# Patient Record
Sex: MALE | Race: WHITE | NOT HISPANIC OR LATINO | Employment: FULL TIME | URBAN - METROPOLITAN AREA
[De-identification: names, ages, dates, MRNs, and addresses within clinical notes are randomized per-mention and may not be internally consistent; named-entity substitution may affect disease eponyms.]

---

## 2017-03-30 ENCOUNTER — OFFICE VISIT (OUTPATIENT)
Dept: MEDICAL GROUP | Age: 37
End: 2017-03-30
Payer: COMMERCIAL

## 2017-03-30 VITALS
HEIGHT: 71 IN | HEART RATE: 64 BPM | TEMPERATURE: 97.8 F | WEIGHT: 202.2 LBS | OXYGEN SATURATION: 99 % | BODY MASS INDEX: 28.31 KG/M2 | SYSTOLIC BLOOD PRESSURE: 116 MMHG | RESPIRATION RATE: 16 BRPM | DIASTOLIC BLOOD PRESSURE: 74 MMHG

## 2017-03-30 DIAGNOSIS — Z00.00 ANNUAL PHYSICAL EXAM: ICD-10-CM

## 2017-03-30 DIAGNOSIS — E78.5 HYPERLIPIDEMIA, UNSPECIFIED HYPERLIPIDEMIA TYPE: ICD-10-CM

## 2017-03-30 PROCEDURE — 99385 PREV VISIT NEW AGE 18-39: CPT | Performed by: PHYSICIAN ASSISTANT

## 2017-03-30 NOTE — ASSESSMENT & PLAN NOTE
This is a 36-year-old male who is here today for a physical. No complaints today. He takes Lipitor for hyperlipidemia. Last labs were about 2 years ago. Both of his parents have high cholesterol. Father has diabetes. He doesn't smoke or drink. He is  with 4 children. 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

## 2017-03-30 NOTE — PROGRESS NOTES
"Subjective:   Jacky Paz is a 36 y.o. male here today for annual physical.    Annual physical exam  This is a 36-year-old male who is here today for a physical. No complaints today. He takes Lipitor for hyperlipidemia. Last labs were about 2 years ago. Both of his parents have high cholesterol. Father has diabetes. He doesn't smoke or drink. He is  with 4 children. 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       Current medicines (including changes today)  Current Outpatient Prescriptions   Medication Sig Dispense Refill   • atorvastatin (LIPITOR) 20 MG TABS Take 20 mg by mouth every evening.       No current facility-administered medications for this visit.     He  has no past medical history on file.    ROS   No chest pain, no shortness of breath, no abdominal pain and all other systems were reviewed and are negative.       Objective:     Blood pressure 116/74, pulse 64, temperature 36.6 °C (97.8 °F), resp. rate 16, height 1.803 m (5' 10.98\"), weight 91.717 kg (202 lb 3.2 oz), SpO2 99 %. Body mass index is 28.21 kg/(m^2).   Physical Exam:  Constitutional: Alert, no distress.  Skin: Warm, dry, good turgor, no rashes in visible areas.  Eye: Equal, round and reactive, conjunctiva clear, lids normal.  ENMT: Lips without lesions, good dentition, oropharynx clear.  Neck: Trachea midline, no masses.   Lymph: No cervical or supraclavicular lymphadenopathy  Respiratory: Unlabored respiratory effort, lungs clear to auscultation, no wheezes, no ronchi.  Cardiovascular: Normal S1, S2, no murmur, no edema.  Abdomen: Soft, non-tender, no masses.  Psych: Alert and oriented x3, normal affect and mood.        Assessment and Plan:   The following treatment plan was discussed    1. Annual physical exam  Generally healthy male. Order lipid profile. May contact me when medication refill as necessary. Also order CMP and hemoglobin A1c. Will contact with results.  - LIPID PANEL  - COMP METABOLIC PANEL; Future  - HEMOGLOBIN A1C; " Future      Followup: Return if symptoms worsen or fail to improve.    Please note that this dictation was created using voice recognition software. I have made every reasonable attempt to correct obvious errors, but I expect that there are errors of grammar and possibly content that I did not discover before finalizing the note.

## 2017-03-30 NOTE — MR AVS SNAPSHOT
"        Jacky Paz   3/30/2017 4:20 PM   Office Visit   MRN: [id number]    Department:  [address] [place] [address]   Dept Phone:  [phone]    Description:  Male : 1980   Provider:  Uli Anderson PA-C           Reason for Visit     Establish Care labs and check-up      Allergies as of 3/30/2017     No Known Allergies      You were diagnosed with     Annual physical exam   [169540]         Vital Signs     Blood Pressure Pulse Temperature Respirations Height Weight    116/74 mmHg 64 36.6 °C (97.8 °F) 16 1.803 m (5' 10.98\") 91.717 kg (202 lb 3.2 oz)    Body Mass Index Oxygen Saturation Smoking Status             28.21 kg/m2 99% Never Smoker          Basic Information     Date Of Birth Sex Race Ethnicity Preferred Language    1980 Male White Non- English      Problem List              ICD-10-CM Priority Class Noted - Resolved    Annual physical exam Z00.00   3/30/2017 - Present      Health Maintenance        Date Due Completion Dates    IMM INFLUENZA (1) 3/30/2018 (Originally 2016) ---    IMM DTaP/Tdap/Td Vaccine (2 - Td) 8/3/2025 8/3/2015            Current Immunizations     Tdap Vaccine 8/3/2015      Below and/or attached are the medications your provider expects you to take. Review all of your home medications and newly ordered medications with your provider and/or pharmacist. Follow medication instructions as directed by your provider and/or pharmacist. Please keep your medication list with you and share with your provider. Update the information when medications are discontinued, doses are changed, or new medications (including over-the-counter products) are added; and carry medication information at all times in the event of emergency situations     Allergies:  No Known Allergies          Medications  Valid as of: 2017 -  4:52 PM    Generic Name Brand Name Tablet Size Instructions for use    Atorvastatin Calcium (Tab) LIPITOR 20 MG Take 20 mg by mouth every evening.        .      "           Medicines prescribed today were sent to:     [id number] - [place], [place] - [address]    [address] [place] [address]    Phone: [phone] Fax: [phone]    Open 24 Hours?: No      Medication refill instructions:       If your prescription bottle indicates you have medication refills left, it is not necessary to call your provider’s office. Please contact your pharmacy and they will refill your medication.    If your prescription bottle indicates you do not have any refills left, you may request refills at any time through one of the following ways: The online hoccer system (except Urgent Care), by calling your provider’s office, or by asking your pharmacy to contact your provider’s office with a refill request. Medication refills are processed only during regular business hours and may not be available until the next business day. Your provider may request additional information or to have a follow-up visit with you prior to refilling your medication.   *Please Note: Medication refills are assigned a new Rx number when refilled electronically. Your pharmacy may indicate that no refills were authorized even though a new prescription for the same medication is available at the pharmacy. Please request the medicine by name with the pharmacy before contacting your provider for a refill.        Your To Do List     Future Labs/Procedures Complete By Expires    COMP METABOLIC PANEL  As directed 3/30/2018    HEMOGLOBIN A1C  As directed 3/30/2018         hoccer Access Code: [email or web address]  Expires: 4/29/2017  4:07 PM    hoccer  A secure, online tool to manage your health information     [employer] hoccer® is a secure, online tool that connects you to your personalized health information from the privacy of your home -- day or night - making it very easy for you to manage your healthcare. Once the activation process is completed, you can even access your medical information  using the [facility] [facility] júnior, which is available for free in the [employer] Júnior store or Google Play store.     W[employer] Inns provides the following levels of access (as shown below):   My Chart Features   [facility] Doctor [facility]  Specialists [facility]  Urgent  Care Non-[facility]  Primary Care  Doctor   Email your healthcare team securely and privately 24/7 X X X    Manage appointments: schedule your next appointment; view details of past/upcoming appointments X      Request prescription refills. X      View recent personal medical records, including lab and immunizations X X X X   View health record, including health history, allergies, medications X X X X   Read reports about your outpatient visits, procedures, consult and ER notes X X X X   See your discharge summary, which is a recap of your hospital and/or ER visit that includes your diagnosis, lab results, and care plan. X X       How to register for W[employer] Inns:  1. Go to  [email or web address].  2. Click on the Sign Up Now box, which takes you to the New Member Sign Up page. You will need to provide the following information:  a. Enter your W[employer] Inns Access Code exactly as it appears at the top of this page. (You will not need to use this code after you’ve completed the sign-up process. If you do not sign up before the expiration date, you must request a new code.)   b. Enter your date of birth.   c. Enter your home email address.   d. Click Submit, and follow the next screen’s instructions.  3. Create a Wear Inns ID. This will be your W[employer] Inns login ID and cannot be changed, so think of one that is secure and easy to remember.  4. Create a [facility] Inns password. You can change your password at any time.  5. Enter your Password Reset Question and Answer. This can be used at a later time if you forget your password.   6. Enter your e-mail address. This allows you to receive e-mail notifications when new information is available in W[employer] Inns.  7. Click Sign Up. You can now view your  health information.    For assistance activating your [employer] account, call [phone]

## 2017-05-19 ENCOUNTER — HOSPITAL ENCOUNTER (OUTPATIENT)
Dept: LAB | Facility: MEDICAL CENTER | Age: 37
End: 2017-05-19
Attending: PHYSICIAN ASSISTANT
Payer: COMMERCIAL

## 2017-05-19 DIAGNOSIS — Z00.00 ANNUAL PHYSICAL EXAM: ICD-10-CM

## 2017-05-19 LAB
ALBUMIN SERPL BCP-MCNC: 4.6 G/DL (ref 3.2–4.9)
ALBUMIN/GLOB SERPL: 1.5 G/DL
ALP SERPL-CCNC: 97 U/L (ref 30–99)
ALT SERPL-CCNC: 27 U/L (ref 2–50)
ANION GAP SERPL CALC-SCNC: 6 MMOL/L (ref 0–11.9)
AST SERPL-CCNC: 24 U/L (ref 12–45)
BILIRUB SERPL-MCNC: 2.6 MG/DL (ref 0.1–1.5)
BUN SERPL-MCNC: 17 MG/DL (ref 8–22)
CALCIUM SERPL-MCNC: 9.9 MG/DL (ref 8.5–10.5)
CHLORIDE SERPL-SCNC: 104 MMOL/L (ref 96–112)
CHOLEST SERPL-MCNC: 212 MG/DL (ref 100–199)
CO2 SERPL-SCNC: 28 MMOL/L (ref 20–33)
CREAT SERPL-MCNC: 1.1 MG/DL (ref 0.5–1.4)
CREAT UR-MCNC: 175.3 MG/DL
EST. AVERAGE GLUCOSE BLD GHB EST-MCNC: 103 MG/DL
GFR SERPL CREATININE-BSD FRML MDRD: >60 ML/MIN/1.73 M 2
GLOBULIN SER CALC-MCNC: 3.1 G/DL (ref 1.9–3.5)
GLUCOSE SERPL-MCNC: 89 MG/DL (ref 65–99)
HBA1C MFR BLD: 5.2 % (ref 0–5.6)
HDLC SERPL-MCNC: 48 MG/DL
LDLC SERPL CALC-MCNC: 142 MG/DL
MICROALBUMIN UR-MCNC: <0.7 MG/DL
MICROALBUMIN/CREAT UR: NORMAL MG/G (ref 0–30)
POTASSIUM SERPL-SCNC: 4 MMOL/L (ref 3.6–5.5)
PROT SERPL-MCNC: 7.7 G/DL (ref 6–8.2)
SODIUM SERPL-SCNC: 138 MMOL/L (ref 135–145)
TRIGL SERPL-MCNC: 108 MG/DL (ref 0–149)

## 2017-05-19 PROCEDURE — 80053 COMPREHEN METABOLIC PANEL: CPT

## 2017-05-19 PROCEDURE — 36415 COLL VENOUS BLD VENIPUNCTURE: CPT

## 2017-05-19 PROCEDURE — 83036 HEMOGLOBIN GLYCOSYLATED A1C: CPT

## 2017-05-19 PROCEDURE — 80061 LIPID PANEL: CPT

## 2017-05-19 PROCEDURE — 82043 UR ALBUMIN QUANTITATIVE: CPT

## 2017-05-19 PROCEDURE — 82570 ASSAY OF URINE CREATININE: CPT

## 2017-05-22 DIAGNOSIS — E78.5 HYPERLIPIDEMIA, UNSPECIFIED HYPERLIPIDEMIA TYPE: ICD-10-CM

## 2017-05-22 DIAGNOSIS — R17 TOTAL BILIRUBIN, ELEVATED: ICD-10-CM

## 2017-05-22 RX ORDER — ATORVASTATIN CALCIUM 20 MG/1
20 TABLET, FILM COATED ORAL
Qty: 30 TAB | Status: CANCELLED | OUTPATIENT
Start: 2017-05-22

## 2018-05-07 ENCOUNTER — OFFICE VISIT (OUTPATIENT)
Dept: MEDICAL GROUP | Facility: MEDICAL CENTER | Age: 38
End: 2018-05-07
Payer: COMMERCIAL

## 2018-05-07 VITALS
HEIGHT: 71 IN | HEART RATE: 56 BPM | TEMPERATURE: 98.3 F | SYSTOLIC BLOOD PRESSURE: 116 MMHG | WEIGHT: 208 LBS | OXYGEN SATURATION: 100 % | BODY MASS INDEX: 29.12 KG/M2 | DIASTOLIC BLOOD PRESSURE: 70 MMHG

## 2018-05-07 DIAGNOSIS — Z82.49 FAMILY HISTORY OF PREMATURE CAD: ICD-10-CM

## 2018-05-07 DIAGNOSIS — Z13.21 ENCOUNTER FOR VITAMIN DEFICIENCY SCREENING: ICD-10-CM

## 2018-05-07 DIAGNOSIS — E78.5 HYPERLIPIDEMIA LDL GOAL <100: ICD-10-CM

## 2018-05-07 DIAGNOSIS — Z13.29 SCREENING FOR THYROID DISORDER: ICD-10-CM

## 2018-05-07 DIAGNOSIS — Z13.89 SCREENING FOR MULTIPLE CONDITIONS: ICD-10-CM

## 2018-05-07 PROCEDURE — 99214 OFFICE O/P EST MOD 30 MIN: CPT | Performed by: NURSE PRACTITIONER

## 2018-05-07 ASSESSMENT — PATIENT HEALTH QUESTIONNAIRE - PHQ9: CLINICAL INTERPRETATION OF PHQ2 SCORE: 0

## 2018-05-08 NOTE — PROGRESS NOTES
"  Subjective:     Jacky Paz is a 38 y.o. male who presents with hyperlipidemia.    HPI:   Seen in f/u for hyperlipdiemia.  He has a + FH of very high chol.  Mother and mat cousin with premature CAD.  He was initially on 10 mg.  Then last year he was inc to 20 mg d/t LDL of 142.    He has high chol.  He is on lipitor.  They inc dose last year. needs new lab. He is kiara the lipitor w/o s/e.    Otherwise feeling well.  Just got over URI with cough.       Patient Active Problem List    Diagnosis Date Noted   • Annual physical exam 03/30/2017   • Hyperlipidemia 03/30/2017       Current medicines (including changes today)  Current Outpatient Prescriptions   Medication Sig Dispense Refill   • atorvastatin (LIPITOR) 20 MG TABS Take 20 mg by mouth every evening.       No current facility-administered medications for this visit.        No Known Allergies    ROS  Constitutional: Negative. Negative for fever, chills, weight loss, malaise/fatigue and diaphoresis.   HENT: Negative. Negative for hearing loss, ear pain, nosebleeds, congestion, sore throat, neck pain, tinnitus and ear discharge.   Respiratory: Negative. Negative for cough, hemoptysis, sputum production, shortness of breath, wheezing and stridor.   Cardiovascular: Negative. Negative for chest pain, palpitations, orthopnea, claudication, leg swelling and PND.   Gastrointestinal: Denies nausea, vomiting, diarrhea, constipation, heartburn, melena or hematochezia.  Genitourinary: Denies dysuria, hematuria, urinary incontinence, frequency or urgency.        Objective:     Blood pressure 116/70, pulse (!) 56, temperature 36.8 °C (98.3 °F), height 1.803 m (5' 11\"), weight 94.3 kg (208 lb), SpO2 100 %. Body mass index is 29.01 kg/m².    Physical Exam:  Vitals reviewed.  Constitutional: Oriented to person, place, and time. appears well-developed and well-nourished. No distress.   Cardiovascular: Normal rate, regular rhythm, normal heart sounds and intact distal pulses. " Exam reveals no gallop and no friction rub. No murmur heard. No carotid bruits.   Pulmonary/Chest: Effort normal and breath sounds normal. No stridor. No respiratory distress. no wheezes or rales. exhibits no tenderness.   Musculoskeletal: Normal range of motion. exhibits no edema. rudy pedal pulses 2+.  Lymphadenopathy: No cervical or supraclavicular adenopathy.   Neurological: Alert and oriented to person, place, and time. exhibits normal muscle tone.  Skin: Skin is warm and dry. No diaphoresis.   Psychiatric: Normal mood and affect. Behavior is normal.      Assessment and Plan:     The following treatment plan was discussed:    1. Hyperlipidemia LDL goal <100  COMP METABOLIC PANEL    LIPID PROFILE    CT-HEART W/O CONT EVAL CALCIUM    do lab wiht CMP, LP, TSH, D.  f/u with PCP for review   2. Family history of premature CAD  CT-HEART W/O CONT EVAL CALCIUM    check ct for ca++ score.  f/u with pt with results.     3. Screening for thyroid disorder  TSH   4. Screening for multiple conditions  COMP METABOLIC PANEL   5. Encounter for vitamin deficiency screening  VITAMIN D,25 HYDROXY         Followup: Return in about 4 weeks (around 6/4/2018).

## 2018-05-18 ENCOUNTER — HOSPITAL ENCOUNTER (OUTPATIENT)
Dept: LAB | Facility: MEDICAL CENTER | Age: 38
End: 2018-05-18
Attending: NURSE PRACTITIONER
Payer: COMMERCIAL

## 2018-05-18 ENCOUNTER — HOSPITAL ENCOUNTER (OUTPATIENT)
Dept: RADIOLOGY | Facility: MEDICAL CENTER | Age: 38
End: 2018-05-18
Attending: NURSE PRACTITIONER

## 2018-05-18 DIAGNOSIS — E78.5 HYPERLIPIDEMIA LDL GOAL <100: ICD-10-CM

## 2018-05-18 DIAGNOSIS — Z82.49 FAMILY HISTORY OF PREMATURE CAD: ICD-10-CM

## 2018-05-18 DIAGNOSIS — Z13.21 ENCOUNTER FOR VITAMIN DEFICIENCY SCREENING: ICD-10-CM

## 2018-05-18 DIAGNOSIS — Z13.89 SCREENING FOR MULTIPLE CONDITIONS: ICD-10-CM

## 2018-05-18 DIAGNOSIS — Z13.29 SCREENING FOR THYROID DISORDER: ICD-10-CM

## 2018-05-18 LAB
25(OH)D3 SERPL-MCNC: 21 NG/ML (ref 30–100)
ALBUMIN SERPL BCP-MCNC: 4.6 G/DL (ref 3.2–4.9)
ALBUMIN/GLOB SERPL: 1.6 G/DL
ALP SERPL-CCNC: 81 U/L (ref 30–99)
ALT SERPL-CCNC: 47 U/L (ref 2–50)
ANION GAP SERPL CALC-SCNC: 7 MMOL/L (ref 0–11.9)
AST SERPL-CCNC: 25 U/L (ref 12–45)
BILIRUB SERPL-MCNC: 1.8 MG/DL (ref 0.1–1.5)
BUN SERPL-MCNC: 21 MG/DL (ref 8–22)
CALCIUM SERPL-MCNC: 9.7 MG/DL (ref 8.5–10.5)
CHLORIDE SERPL-SCNC: 107 MMOL/L (ref 96–112)
CHOLEST SERPL-MCNC: 184 MG/DL (ref 100–199)
CO2 SERPL-SCNC: 27 MMOL/L (ref 20–33)
CREAT SERPL-MCNC: 1.02 MG/DL (ref 0.5–1.4)
GLOBULIN SER CALC-MCNC: 2.9 G/DL (ref 1.9–3.5)
GLUCOSE SERPL-MCNC: 94 MG/DL (ref 65–99)
HDLC SERPL-MCNC: 46 MG/DL
LDLC SERPL CALC-MCNC: 110 MG/DL
POTASSIUM SERPL-SCNC: 4 MMOL/L (ref 3.6–5.5)
PROT SERPL-MCNC: 7.5 G/DL (ref 6–8.2)
SODIUM SERPL-SCNC: 141 MMOL/L (ref 135–145)
TRIGL SERPL-MCNC: 138 MG/DL (ref 0–149)
TSH SERPL DL<=0.005 MIU/L-ACNC: 1.56 UIU/ML (ref 0.38–5.33)

## 2018-05-18 PROCEDURE — 82306 VITAMIN D 25 HYDROXY: CPT

## 2018-05-18 PROCEDURE — 80053 COMPREHEN METABOLIC PANEL: CPT

## 2018-05-18 PROCEDURE — 80061 LIPID PANEL: CPT

## 2018-05-18 PROCEDURE — 84443 ASSAY THYROID STIM HORMONE: CPT

## 2018-05-18 PROCEDURE — 4410556 CT-CARDIAC SCORING

## 2018-05-18 PROCEDURE — 36415 COLL VENOUS BLD VENIPUNCTURE: CPT

## 2018-05-19 ENCOUNTER — TELEPHONE (OUTPATIENT)
Dept: MEDICAL GROUP | Facility: MEDICAL CENTER | Age: 38
End: 2018-05-19

## 2018-05-19 DIAGNOSIS — R93.1 ELEVATED CORONARY ARTERY CALCIUM SCORE: ICD-10-CM

## 2018-05-19 NOTE — TELEPHONE ENCOUNTER
Please let pt know that the CT for coronary ca++ score shows probable mod risk for CAD.  i strongly recommend proceeding with stress test, echo.  Let me know if he is agreeable.  Then have him f/u after testing to discuss

## 2018-05-21 ENCOUNTER — TELEPHONE (OUTPATIENT)
Dept: MEDICAL GROUP | Facility: MEDICAL CENTER | Age: 38
End: 2018-05-21

## 2018-05-21 NOTE — LETTER
May 21, 2018        Jacky Paz  [address] [place] [address]        Dear Jacky:    After careful review of your chart, we have noted you are due a follow up appointment.  We request you call our office at [phone] at your earliest convenience and make an appointment.     We look forward to scheduling an appointment for you, so that we may provide you with the safest and most complete medical care.        If you have any questions or concerns, please don't hesitate to call.        Sincerely,        Tasia CALL    Electronically Signed

## 2018-05-21 NOTE — TELEPHONE ENCOUNTER
----- Message from ONEYDA Rutledge sent at 5/19/2018  3:09 PM PDT -----  Please have pt set appointment to review and discuss treatment for labs with me or his PCP - yamini beach.

## 2018-05-22 NOTE — TELEPHONE ENCOUNTER
Pt informed- pt states he will do the echo and stress test-pt will be out of the [place] for a week but will complete when he returns

## 2018-06-15 ENCOUNTER — TELEPHONE (OUTPATIENT)
Dept: MEDICAL GROUP | Facility: MEDICAL CENTER | Age: 38
End: 2018-06-15

## 2018-06-21 ENCOUNTER — OFFICE VISIT (OUTPATIENT)
Dept: MEDICAL GROUP | Facility: MEDICAL CENTER | Age: 38
End: 2018-06-21
Payer: COMMERCIAL

## 2018-06-21 VITALS
BODY MASS INDEX: 28.92 KG/M2 | TEMPERATURE: 97.9 F | SYSTOLIC BLOOD PRESSURE: 110 MMHG | HEART RATE: 67 BPM | DIASTOLIC BLOOD PRESSURE: 60 MMHG | OXYGEN SATURATION: 97 % | WEIGHT: 206.57 LBS | HEIGHT: 71 IN

## 2018-06-21 DIAGNOSIS — E80.4 GILBERT SYNDROME: ICD-10-CM

## 2018-06-21 DIAGNOSIS — E78.00 PURE HYPERCHOLESTEROLEMIA: ICD-10-CM

## 2018-06-21 DIAGNOSIS — E55.9 VITAMIN D DEFICIENCY: ICD-10-CM

## 2018-06-21 PROBLEM — Z00.00 ANNUAL PHYSICAL EXAM: Status: RESOLVED | Noted: 2017-03-30 | Resolved: 2018-06-21

## 2018-06-21 PROCEDURE — 99214 OFFICE O/P EST MOD 30 MIN: CPT | Performed by: PHYSICIAN ASSISTANT

## 2018-06-21 RX ORDER — ATORVASTATIN CALCIUM 40 MG/1
40 TABLET, FILM COATED ORAL
Qty: 90 TAB | Refills: 3 | Status: SHIPPED | OUTPATIENT
Start: 2018-06-21 | End: 2018-06-26

## 2018-06-21 RX ORDER — ATORVASTATIN CALCIUM 40 MG/1
20 TABLET, FILM COATED ORAL
Qty: 90 TAB | Refills: 3 | Status: SHIPPED | OUTPATIENT
Start: 2018-06-21 | End: 2018-06-21 | Stop reason: SDUPTHER

## 2018-06-21 NOTE — ASSESSMENT & PLAN NOTE
This is a 38-year-old male who comes in today to follow-up on his hyperlipidemia. Was seen by Tasia Hobson. She ordered a CT cardiac which showed the following:    Impression       1.  There is definite, at least moderate atherosclerotic plaque burden.    2.  Nonobstructive coronary artery disease is highly likely and obstructive disease is possible.    3.  The patient's cardiovascular risk is moderately high.    4.  Aggressive risk factor modification is suggested.    5.  Further evaluation should be based upon global assessment of cardiovascular risk factors in addition to this test. Testing for inducible ischemia may be indicated depending upon the patient's global assessment.     He is currently on 20 mg of Lipitor. Previously I had offered to increase the dose to 40 because his cholesterol profile is still elevated with an LDL of 110. He never got back to me. His wife made an appointment see Tasia Hobson. After the evaluation with her she ordered a stress test as well as a echocardiogram.

## 2018-06-21 NOTE — PROGRESS NOTES
"Subjective:   Jacky Paz is a 38 y.o. male here today for hyperlipidemia.    Hyperlipidemia  This is a 38-year-old male who comes in today to follow-up on his hyperlipidemia. Was seen by Tasia Hobson. She ordered a CT cardiac which showed the following:    Impression       1.  There is definite, at least moderate atherosclerotic plaque burden.    2.  Nonobstructive coronary artery disease is highly likely and obstructive disease is possible.    3.  The patient's cardiovascular risk is moderately high.    4.  Aggressive risk factor modification is suggested.    5.  Further evaluation should be based upon global assessment of cardiovascular risk factors in addition to this test. Testing for inducible ischemia may be indicated depending upon the patient's global assessment.     He is currently on 20 mg of Lipitor. Previously I had offered to increase the dose to 40 because his cholesterol profile is still elevated with an LDL of 110. He never got back to me. His wife made an appointment see Tasia Hobson. After the evaluation with her she ordered a stress test as well as a echocardiogram.    Gilbert syndrome  He does have a chronic history of Gilbert's syndrome. Last bilirubin was at 1.8. Previously it was 2.6. No history of jaundice.    Vitamin D deficiency  Vitamin D level was low at 21.       Current medicines (including changes today)  Current Outpatient Prescriptions   Medication Sig Dispense Refill   • atorvastatin (LIPITOR) 40 MG Tab Take 1 Tab by mouth every bedtime. 90 Tab 3     No current facility-administered medications for this visit.      He  has no past medical history on file.    Social History and Family History were reviewed and updated.    ROS   No chest pain, no shortness of breath, no abdominal pain and all other systems were reviewed and are negative.       Objective:     Blood pressure 110/60, pulse 67, temperature 36.6 °C (97.9 °F), height 1.803 m (5' 11\"), weight 93.7 kg (206 lb 9.1 " oz), SpO2 97 %. Body mass index is 28.81 kg/m².   Physical Exam:  Constitutional: Alert, no distress.  Skin: Warm, dry, good turgor, no rashes in visible areas.  Eye: Equal, round and reactive, conjunctiva clear, lids normal.  ENMT: Lips without lesions, good dentition, oropharynx clear.  Neck: Trachea midline, no masses.   Lymph: No cervical or supraclavicular lymphadenopathy  Respiratory: Unlabored respiratory effort, lung appear clear, no wheezes.  Cardiovascular: Normal S1, S2, no murmur, no edema.  Psych: Alert and oriented x3, normal affect and mood.        Assessment and Plan:   The following treatment plan was discussed    1. Pure hypercholesterolemia  Chronic condition. Given that his LDL isn't below 100 will increase Lipitor to 40 mg at bedtime. Repeat lipid panel in 6 weeks. Referred to vascular medicine to discuss familiar hyperlipidemia.  - LIPID PROFILE; Future  - REFERRAL TO VASCULAR MEDICINE Reason for Referral? Lipids  - atorvastatin (LIPITOR) 40 MG Tab; Take 1 Tab by mouth every bedtime.  Dispense: 90 Tab; Refill: 3    2. Gilbert syndrome  Chronic condition. Stable.    3. Vitamin D deficiency  Acute, new onset condition. Advised take over-the-counter vitamin D 2000 units daily. We'll repeat I and D at some point in the future.    Patient was seen for 25 minutes face to face of which > 50% of appointment time was spent on counseling and coordination of care regarding the above.    Followup: Return if symptoms worsen or fail to improve.    Please note that this dictation was created using voice recognition software. I have made every reasonable attempt to correct obvious errors, but I expect that there are errors of grammar and possibly content that I did not discover before finalizing the note.

## 2018-06-21 NOTE — ASSESSMENT & PLAN NOTE
He does have a chronic history of Gilbert's syndrome. Last bilirubin was at 1.8. Previously it was 2.6. No history of jaundice.

## 2018-06-26 DIAGNOSIS — E78.00 PURE HYPERCHOLESTEROLEMIA: ICD-10-CM

## 2018-06-26 RX ORDER — ROSUVASTATIN CALCIUM 40 MG/1
40 TABLET, COATED ORAL EVERY EVENING
Qty: 60 TAB | Refills: 1 | Status: SHIPPED | OUTPATIENT
Start: 2018-06-26 | End: 2018-08-24

## 2018-08-24 ENCOUNTER — HOSPITAL ENCOUNTER (OUTPATIENT)
Dept: LAB | Facility: MEDICAL CENTER | Age: 38
End: 2018-08-24
Attending: FAMILY MEDICINE
Payer: COMMERCIAL

## 2018-08-24 ENCOUNTER — OFFICE VISIT (OUTPATIENT)
Dept: VASCULAR LAB | Facility: MEDICAL CENTER | Age: 38
End: 2018-08-24
Attending: FAMILY MEDICINE
Payer: COMMERCIAL

## 2018-08-24 VITALS
BODY MASS INDEX: 28.84 KG/M2 | SYSTOLIC BLOOD PRESSURE: 122 MMHG | DIASTOLIC BLOOD PRESSURE: 66 MMHG | HEART RATE: 43 BPM | HEIGHT: 71 IN | WEIGHT: 206 LBS

## 2018-08-24 DIAGNOSIS — E78.00 PURE HYPERCHOLESTEROLEMIA: ICD-10-CM

## 2018-08-24 LAB
ALBUMIN SERPL BCP-MCNC: 4.8 G/DL (ref 3.2–4.9)
ALBUMIN/GLOB SERPL: 1.7 G/DL
ALP SERPL-CCNC: 77 U/L (ref 30–99)
ALT SERPL-CCNC: 35 U/L (ref 2–50)
ANION GAP SERPL CALC-SCNC: 6 MMOL/L (ref 0–11.9)
AST SERPL-CCNC: 27 U/L (ref 12–45)
BILIRUB SERPL-MCNC: 2.8 MG/DL (ref 0.1–1.5)
BUN SERPL-MCNC: 16 MG/DL (ref 8–22)
CALCIUM SERPL-MCNC: 10.2 MG/DL (ref 8.5–10.5)
CHLORIDE SERPL-SCNC: 105 MMOL/L (ref 96–112)
CO2 SERPL-SCNC: 28 MMOL/L (ref 20–33)
CREAT SERPL-MCNC: 1.04 MG/DL (ref 0.5–1.4)
CRP SERPL HS-MCNC: 0.5 MG/L (ref 0–7.5)
GLOBULIN SER CALC-MCNC: 2.9 G/DL (ref 1.9–3.5)
GLUCOSE SERPL-MCNC: 85 MG/DL (ref 65–99)
POTASSIUM SERPL-SCNC: 5.2 MMOL/L (ref 3.6–5.5)
PROT SERPL-MCNC: 7.7 G/DL (ref 6–8.2)
SODIUM SERPL-SCNC: 139 MMOL/L (ref 135–145)

## 2018-08-24 PROCEDURE — 99204 OFFICE O/P NEW MOD 45 MIN: CPT | Performed by: FAMILY MEDICINE

## 2018-08-24 PROCEDURE — 83704 LIPOPROTEIN BLD QUAN PART: CPT

## 2018-08-24 PROCEDURE — 36415 COLL VENOUS BLD VENIPUNCTURE: CPT

## 2018-08-24 PROCEDURE — 86141 C-REACTIVE PROTEIN HS: CPT

## 2018-08-24 PROCEDURE — 80053 COMPREHEN METABOLIC PANEL: CPT

## 2018-08-24 PROCEDURE — 80061 LIPID PANEL: CPT

## 2018-08-24 PROCEDURE — 99202 OFFICE O/P NEW SF 15 MIN: CPT | Performed by: FAMILY MEDICINE

## 2018-08-24 RX ORDER — ROSUVASTATIN CALCIUM 40 MG/1
40 TABLET, COATED ORAL DAILY
Qty: 90 TAB | Refills: 3 | Status: SHIPPED | OUTPATIENT
Start: 2018-08-24 | End: 2019-10-04 | Stop reason: SDUPTHER

## 2018-08-24 ASSESSMENT — ENCOUNTER SYMPTOMS
INSOMNIA: 0
BLOOD IN STOOL: 0
SORE THROAT: 0
DEPRESSION: 0
DIARRHEA: 0
FOCAL WEAKNESS: 0
HEADACHES: 0
SEIZURES: 0
FEVER: 0
TREMORS: 0
MYALGIAS: 0
NAUSEA: 0
BRUISES/BLEEDS EASILY: 0
COUGH: 0
DIZZINESS: 0
CHILLS: 0
BLURRED VISION: 0
DOUBLE VISION: 0
SHORTNESS OF BREATH: 0
WEAKNESS: 0
ORTHOPNEA: 0
HEMOPTYSIS: 0
PALPITATIONS: 0
NERVOUS/ANXIOUS: 0
ABDOMINAL PAIN: 0
WHEEZING: 0
VOMITING: 0

## 2018-08-24 NOTE — PROGRESS NOTES
INITIAL VASCULAR MED VISIT  Subjective:   Jacky Paz is a 38 y.o. male who presents today 8/24/2018 for   Chief Complaint   Patient presents with   • New Patient     Hypercholesterolemia   Initially referred by PCP (LEONARDO Anderson) for evaluation and mgmt of HLD    HPI:  Jacky Paz with pmhx with FH.  Tc = 399 when age 11. Untreated has been 270s.  Has been using lipitor and recent change to Crestor 40mg.         Current HTN concerns: Denies   HTN sx:  No current blurred or changed vision, chest pain, shortness of breath, headache, nausea, dizziness/vertigo   Home BP log: none   Adherence to current HTN meds: compliant all of the time   Antiplatelet/anticoagulation: No     Hyperlipidemia: Yes, Details: crestor 40mg , no myalgias.  started last month.  Previously on lipitor and tolerated.  Started age 22    Pertinent HTN hx:   Age at HTN dx:  No dx   Past HTN medications: none  HTN crises:  No prior hospitalization or crises   ASCVD risk factors:     DM: No   CKD:  No  Lifestyle factors:     High salt: No   EtOH: No  Interfering substances:     NSAIDs: No    Decongestants. No    ASCVD calculator (contraindicated if ASCVD+, [id number]-5)   10yr-risk calc: n/a , too young    Lifetime-risk calc: n/a    Clinical evidence of ASCVD:     1) hx of MI/ACS:  No   2) coronary or other revascularization procedure: No   3) TIA/ischemic CVA: No   4) PAD (including ODELL <0.9): No   5) documented (CAD, Renal athero, AA due to athero, carotid plaque >50% stenosis: No    Major RFs:     1) Age (Men>44, women>54):  no   2) Fhx early CAD (<55 men, <65 women):  yes   3) cigarette smoking:  No   4) high BP >139/89 or on tx: no   5) Low HDL-C (<40 men, <50 women):  no    Past Medical History:   Diagnosis Date   • HLD (hyperlipidemia)      Patient Active Problem List   Diagnosis   • Hyperlipidemia   • Vitamin D deficiency   • Gilbert syndrome       History reviewed. No pertinent surgical history.  Family History   Problem Relation Age  of Onset   • Hyperlipidemia Mother         FH, QS=710 untreated   • Heart Disease Mother         4vCABG in 40s, heart valve replacement   • Hypertension Mother    • Hyperlipidemia Father    • Diabetes Father    • Hypertension Father    • Hyperlipidemia Sister         FH, untreated MI=879   • Heart Attack Maternal Grandfather         60s,    • Hyperlipidemia Maternal Grandfather    • Heart Attack Paternal Grandfather          84   • Hyperlipidemia Sister         FH,  due to biking accident    • Heart Disease Cousin         maternal side, 5vCABG in 30s    • Hyperlipidemia Son         AR=195, dx age 11     History   Smoking Status   • Never Smoker   Smokeless Tobacco   • Never Used     Social History   Substance Use Topics   • Smoking status: Never Smoker   • Smokeless tobacco: Never Used   • Alcohol use No     Outpatient Encounter Prescriptions as of 2018   Medication Sig Dispense Refill   • rosuvastatin (CRESTOR) 40 MG tablet Take 1 Tab by mouth every day for 360 days. 90 Tab 3   • [DISCONTINUED] rosuvastatin (CRESTOR) 40 MG tablet Take 1 Tab by mouth every evening. 60 Tab 1     No facility-administered encounter medications on file as of 2018.      No Known Allergies  DIET AND EXERCISE:  Weight Change: no changes   Diet: low fat  Exercise: moderate regular exercise program     Review of Systems   Constitutional: Negative for chills, fever and malaise/fatigue.   HENT: Negative for nosebleeds, sore throat and tinnitus.    Eyes: Negative for blurred vision and double vision.   Respiratory: Negative for cough, hemoptysis, shortness of breath and wheezing.    Cardiovascular: Negative for chest pain, palpitations, orthopnea and leg swelling.   Gastrointestinal: Negative for abdominal pain, blood in stool, diarrhea, melena, nausea and vomiting.   Genitourinary: Negative for hematuria.   Musculoskeletal: Negative for joint pain and myalgias.   Skin: Negative for itching and rash.   Neurological:  "Negative for dizziness, tremors, focal weakness, seizures, weakness and headaches.   Endo/Heme/Allergies: Does not bruise/bleed easily.   Psychiatric/Behavioral: Negative for depression. The patient is not nervous/anxious and does not have insomnia.       Objective:     Vitals:    08/24/18 0953   BP: 122/66   Pulse: (!) 43   Weight: 93.4 kg (206 lb)   Height: 1.803 m (5' 11\")      BP Readings from Last 4 Encounters:   08/24/18 122/66   06/21/18 110/60   05/07/18 116/70   03/30/17 116/74       Body mass index is 28.73 kg/m².   BMI Readings from Last 4 Encounters:   08/24/18 28.73 kg/m²   06/21/18 28.81 kg/m²   05/07/18 29.01 kg/m²   03/30/17 28.21 kg/m²       Physical Exam   Constitutional: He is oriented to person, place, and time. He appears well-developed and well-nourished. He is cooperative. No distress.   HENT:   Head: Normocephalic and atraumatic.   Mouth/Throat: Oropharynx is clear and moist and mucous membranes are normal.   Eyes: Pupils are equal, round, and reactive to light. Conjunctivae are normal.   Neck: Trachea normal and normal range of motion. Neck supple. Normal carotid pulses and no JVD present. Carotid bruit is not present. No thyromegaly present.   Cardiovascular: Normal rate, regular rhythm, normal heart sounds and intact distal pulses.  Exam reveals no gallop and no friction rub.    No murmur heard.  Pulses:       Carotid pulses are 2+ on the right side, and 2+ on the left side.       Radial pulses are 2+ on the right side, and 2+ on the left side.        Dorsalis pedis pulses are 2+ on the right side, and 2+ on the left side.        Posterior tibial pulses are 2+ on the right side, and 2+ on the left side.   Edema:    RLE: none     LLE: none   Spider telangectasia:       RLE:  None      LLE: none   Varicosities:         RLE: none      LLE: none   Corona phlebectatica:      RLE:  None        LLE:  None   Cording:         RLE:  None     LLE: None    Pulmonary/Chest: Effort normal and breath " sounds normal. No respiratory distress.   Abdominal: Soft. Bowel sounds are normal. He exhibits no distension and no mass. There is no hepatosplenomegaly. There is no tenderness. There is no rebound and no guarding.   Musculoskeletal: He exhibits no edema.   Lymphadenopathy:     He has no cervical adenopathy.   Neurological: He is alert and oriented to person, place, and time. No cranial nerve deficit. Coordination and gait normal.   Skin: Skin is warm and dry. He is not diaphoretic. No cyanosis. No pallor. Nails show no clubbing.   Psychiatric: He has a normal mood and affect.     Lab Results   Component Value Date    CHOLSTRLTOT 184 05/18/2018     (H) 05/18/2018    HDL 46 05/18/2018    TRIGLYCERIDE 138 05/18/2018         Lab Results   Component Value Date    HBA1C 5.2 05/19/2017      Lab Results   Component Value Date    SODIUM 139 08/24/2018    POTASSIUM 5.2 08/24/2018    CHLORIDE 105 08/24/2018    CO2 28 08/24/2018    GLUCOSE 85 08/24/2018    BUN 16 08/24/2018    CREATININE 1.04 08/24/2018    IFAFRICA >60 08/24/2018    IFNOTAFR >60 08/24/2018            VASCULAR IMAGING:     CT CAC scoring 5/18/18  Calcium score:  155.8    The visualized portions of the lungs, mediastinum, and upper abdomen are within normal limits.   Impression       1.  There is definite, at least moderate atherosclerotic plaque burden.  2.  Nonobstructive coronary artery disease is highly likely and obstructive disease is possible.  3.  The patient's cardiovascular risk is moderately high.  4.  Aggressive risk factor modification is suggested.  5.  Further evaluation should be based upon global assessment of cardiovascular risk factors in addition to this test. Testing for inducible ischemia may be indicated depending upon the patient's global assessment.     ECHO - PENDING     MPI - PENDING     Medical Decision Making:  Today's Assessment / Status / Plan:     1. Pure hypercholesterolemia  COMP METABOLIC PANEL    NMR LIPO PROFILE     CRP HIGH SENSITIVE (CARDIAC)    rosuvastatin (CRESTOR) 40 MG tablet     Patient Type: Secondary Prevention    Etiology of Established CVD if Present:  Moderate atherosclerotic plaque burden by CT CAC scoring with score = 155    Lipid Management: Qualifies for Statin Therapy Based on 2013 ACC/AHA Guidelines: yes  Calculated 10-Year Risk of ASCVD: N/A  Currently on Statin: Yes  NLA Risk Category:   High:  3+ major RFs, T1/T2D and 0-1 RF and no evidence of end-org damage  Tx threshold: non-HDL-C >129, LDL-C >99  Tx goal:  non-HDL <130, LDL-C <100 (optional: apoB<90)   At goal:  No, needs further evaluation   Tx plan:   - update labs   - continue crestor 40mg  - may qualify for PCSK9 inhibitor based upon LDL targes and Familial hyperlipidemia  - will review with Dr. Bloch due to age     Blood Pressure Management:Goal: ACC/AHA (2017) goal <130/80  Home BP at goal:  yes  Office BP at goal:  yes  Plan:   - continue home BP monitoring, reviewed correct technique:  Yes   - order 24h ABPM:  NO  - continue healthy lifestyle     Glycemic Status: Normal  - continue healthy lifestyle, monitor labs annually     Anti-Platelet/Anti-Coagulant Tx: N\A  - no indications at this age     Smoking: continued complete avoidance of all tobacco products      Physical Activity: advised to engage in aerobics, bicycling, running/ jogging, walking at least 150min/week     Weight Management and Nutrition: Dietary plan was discussed with patient at this visit including avoid excess calories and cholesterol, reviewed plate method .  Recommend 8-10lbs weight loss    Other:  1) CAD, moderate - elevated CT CAC score   - check echo and stress test for further risk strat  - may ref to cardiology based upon results     2) bradycardia - low 40s, no symptoms   - consider zio patch or electrophys eval with cardiology     3) BMI = 28      Instructed to follow-up with PCP for remainder of adult medical needs: yes  We will partner with other providers in the  management of established vascular disease and cardiometabolic risk factors.    Studies to Be Obtained:   Labs to Be Obtained:     Follow up in: 1 months    Shahbaz Dominguez M.D.     Agree with above.  Given FH + cor calcium score would try to target LDLP close to or below 900-1000.  Would consider addition of zetia next visit.    Michael Bloch, MD  [facility]

## 2018-08-24 NOTE — PATIENT INSTRUCTIONS
1) continue crestor 40mg daily     2) aspirin not recommended at this point     3) focus on 8-10lb weight loss,  150min/week of aerobic     4) plate method     5) recheck some advanced lipid profiles    6) finish echo and stress test     7) follow-up in about 1 month

## 2018-08-28 LAB
CHOLEST SERPL-MCNC: 165 MG/DL
HDL PARTICAL NO Q4363: 30 UMOL/L
HDL SIZE Q4361: 8.9 NM
HDLC SERPL-MCNC: 49 MG/DL (ref 40–59)
HLD.LARGE SERPL-SCNC: 4.8 UMOL/L
L VLDL PART NO Q4357: <1.5 NMOL/L
LDL SERPL QN: 20.6 NM
LDL SERPL-SCNC: 1286 NMOL/L
LDL SMALL SERPL-SCNC: 648 NMOL/L
LDLC SERPL CALC-MCNC: 101 MG/DL
PATHOLOGY STUDY: ABNORMAL
TRIGL SERPL-MCNC: 73 MG/DL (ref 30–149)
VLDL SIZE Q4362: 46 NM

## 2018-09-24 ENCOUNTER — APPOINTMENT (OUTPATIENT)
Dept: CARDIOLOGY | Facility: MEDICAL CENTER | Age: 38
End: 2018-09-24
Attending: NURSE PRACTITIONER
Payer: COMMERCIAL

## 2018-09-24 ENCOUNTER — APPOINTMENT (OUTPATIENT)
Dept: RADIOLOGY | Facility: MEDICAL CENTER | Age: 38
End: 2018-09-24
Attending: NURSE PRACTITIONER
Payer: COMMERCIAL

## 2018-10-11 ENCOUNTER — OFFICE VISIT (OUTPATIENT)
Dept: VASCULAR LAB | Facility: MEDICAL CENTER | Age: 38
End: 2018-10-11
Attending: FAMILY MEDICINE
Payer: COMMERCIAL

## 2018-10-11 VITALS
SYSTOLIC BLOOD PRESSURE: 136 MMHG | WEIGHT: 204 LBS | HEIGHT: 71 IN | HEART RATE: 57 BPM | BODY MASS INDEX: 28.56 KG/M2 | DIASTOLIC BLOOD PRESSURE: 63 MMHG

## 2018-10-11 DIAGNOSIS — E78.00 PURE HYPERCHOLESTEROLEMIA: ICD-10-CM

## 2018-10-11 DIAGNOSIS — E80.4 GILBERT SYNDROME: ICD-10-CM

## 2018-10-11 DIAGNOSIS — E55.9 VITAMIN D DEFICIENCY: ICD-10-CM

## 2018-10-11 PROCEDURE — 99214 OFFICE O/P EST MOD 30 MIN: CPT | Performed by: FAMILY MEDICINE

## 2018-10-11 PROCEDURE — 99212 OFFICE O/P EST SF 10 MIN: CPT | Performed by: FAMILY MEDICINE

## 2018-10-11 ASSESSMENT — ENCOUNTER SYMPTOMS
BRUISES/BLEEDS EASILY: 0
BLURRED VISION: 0
DOUBLE VISION: 0
SEIZURES: 0
NERVOUS/ANXIOUS: 0
WEAKNESS: 0
HEMOPTYSIS: 0
VOMITING: 0
HEADACHES: 0
SHORTNESS OF BREATH: 0
SORE THROAT: 0
BLOOD IN STOOL: 0
INSOMNIA: 0
FOCAL WEAKNESS: 0
MYALGIAS: 0
FEVER: 0
ORTHOPNEA: 0
COUGH: 0
DIZZINESS: 0
DIARRHEA: 0
TREMORS: 0
PALPITATIONS: 0
DEPRESSION: 0
WHEEZING: 0
ABDOMINAL PAIN: 0
CHILLS: 0
NAUSEA: 0

## 2018-10-11 NOTE — PATIENT INSTRUCTIONS
1) I will review your case with Dr. Bloch - any more treatment or carotid assessments   2) continue current medications   3) target weight 185lb (20lb)  4) daily physical acitivity  5) 6 months for follow-up

## 2018-10-11 NOTE — PROGRESS NOTES
FOLLOW-UP VASCULAR MED VISIT  Subjective:   Jacky Paz is a 38 y.o. male who presents today 10/11/18 for   Chief Complaint   Patient presents with   • Follow-Up     Pure hypercholesterolemia   Initially referred by PCP (LEONARDO Anderson) for evaluation and mgmt of HLD    HPI:  LAST VISIT 8/24/18  Jacky Paz with pmhx with FH.  Tc = 399 when age 11. Untreated has been 270s.      Hyperlipidemia:  Had ordered echo and stress test at last visit but fee would be $4000, unable to cover cost.     Yes, Details: crestor 40mg , no myalgias.  started last month.  Previously on lipitor and tolerated.  Started age 22    Htn: no current HTN dx or HTN-associated sx     Antiplatelet/anticoagulation: No     Pertinent HTN hx:   Age at HTN dx:  No dx   Past HTN medications: none  HTN crises:  No prior hospitalization or crises   ASCVD risk factors:     DM: No   CKD:  No  Lifestyle factors:     High salt: No   EtOH: No  Interfering substances:     NSAIDs: No    Decongestants. No    ASCVD calculator (contraindicated if ASCVD+, [id number])   10yr-risk calc: n/a , too young    Lifetime-risk calc: n/a    Clinical evidence of ASCVD:     1) hx of MI/ACS:  No   2) coronary or other revascularization procedure: No   3) TIA/ischemic CVA: No   4) PAD (including ODELL <0.9): No   5) documented (CAD, Renal athero, AA due to athero, carotid plaque >50% stenosis: No    Major RFs:     1) Age (Men>44, women>54):  no   2) Fhx early CAD (<55 men, <65 women):  yes   3) cigarette smoking:  No   4) high BP >139/89 or on tx: no   5) Low HDL-C (<40 men, <50 women):  no  Family History   Problem Relation Age of Onset   • Hyperlipidemia Mother         FH, NT=911 untreated   • Heart Disease Mother         4vCABG in 40s, heart valve replacement   • Hypertension Mother    • Other Mother         carotid artery disease, bilat   • Hyperlipidemia Father    • Diabetes Father    • Hypertension Father    • Hyperlipidemia Sister         FH, untreated ZT=953   •  Heart Attack Maternal Grandfather         60s,    • Hyperlipidemia Maternal Grandfather    • Heart Attack Paternal Grandfather          84   • Hyperlipidemia Sister         FH,  due to biking accident    • Heart Disease Cousin         maternal side, 5vCABG in 30s    • Hyperlipidemia Son         MC=194, dx age 11       History   Smoking Status   • Never Smoker   Smokeless Tobacco   • Never Used     Social History   Substance Use Topics   • Smoking status: Never Smoker   • Smokeless tobacco: Never Used   • Alcohol use No     Outpatient Encounter Prescriptions as of 10/11/2018   Medication Sig Dispense Refill   • rosuvastatin (CRESTOR) 40 MG tablet Take 1 Tab by mouth every day for 360 days. 90 Tab 3     No facility-administered encounter medications on file as of 10/11/2018.      No Known Allergies  DIET AND EXERCISE:  Weight Change: no changes   Diet: low fat  Exercise: moderate regular exercise program     Review of Systems   Constitutional: Negative for chills, fever and malaise/fatigue.   HENT: Negative for nosebleeds, sore throat and tinnitus.    Eyes: Negative for blurred vision and double vision.   Respiratory: Negative for cough, hemoptysis, shortness of breath and wheezing.    Cardiovascular: Negative for chest pain, palpitations, orthopnea and leg swelling.   Gastrointestinal: Negative for abdominal pain, blood in stool, diarrhea, melena, nausea and vomiting.   Genitourinary: Negative for hematuria.   Musculoskeletal: Negative for joint pain and myalgias.   Skin: Negative for itching and rash.   Neurological: Negative for dizziness, tremors, focal weakness, seizures, weakness and headaches.   Endo/Heme/Allergies: Does not bruise/bleed easily.   Psychiatric/Behavioral: Negative for depression. The patient is not nervous/anxious and does not have insomnia.       Objective:     Vitals:    10/11/18 1559   BP: 136/63   BP Location: Left arm   Patient Position: Sitting   BP Cuff Size: Adult   Pulse:  "(!) 57   Weight: 92.5 kg (204 lb)   Height: 1.803 m (5' 11\")      BP Readings from Last 4 Encounters:   10/11/18 136/63   08/24/18 122/66   06/21/18 110/60   05/07/18 116/70       Body mass index is 28.45 kg/m².   BMI Readings from Last 4 Encounters:   10/11/18 28.45 kg/m²   08/24/18 28.73 kg/m²   06/21/18 28.81 kg/m²   05/07/18 29.01 kg/m²       Physical Exam   Constitutional: He is oriented to person, place, and time. He appears well-developed and well-nourished. He is cooperative. No distress.   HENT:   Head: Normocephalic and atraumatic.   Mouth/Throat: Oropharynx is clear and moist and mucous membranes are normal.   Eyes: Pupils are equal, round, and reactive to light. Conjunctivae are normal.   Neck: Trachea normal and normal range of motion. Neck supple. Normal carotid pulses and no JVD present. Carotid bruit is not present. No thyromegaly present.   Cardiovascular: Normal rate, regular rhythm, normal heart sounds and intact distal pulses.  Exam reveals no gallop and no friction rub.    No murmur heard.  Pulses:       Carotid pulses are 2+ on the right side, and 2+ on the left side.       Radial pulses are 2+ on the right side, and 2+ on the left side.        Dorsalis pedis pulses are 2+ on the right side, and 2+ on the left side.        Posterior tibial pulses are 2+ on the right side, and 2+ on the left side.   Edema:    RLE: none     LLE: none   Spider telangectasia:       RLE:  None      LLE: none   Varicosities:         RLE: none      LLE: none   Corona phlebectatica:      RLE:  None        LLE:  None   Cording:         RLE:  None     LLE: None    Pulmonary/Chest: Effort normal and breath sounds normal. No respiratory distress.   Abdominal: Soft. Bowel sounds are normal. He exhibits no distension and no mass. There is no hepatosplenomegaly. There is no tenderness. There is no rebound and no guarding.   Musculoskeletal: He exhibits no edema.   Lymphadenopathy:     He has no cervical adenopathy. "   Neurological: He is alert and oriented to person, place, and time. No cranial nerve deficit. Coordination and gait normal.   Skin: Skin is warm and dry. He is not diaphoretic. No cyanosis. No pallor. Nails show no clubbing.   Psychiatric: He has a normal mood and affect.     Lab Results   Component Value Date    CHOLSTRLTOT 165 08/24/2018    CHOLSTRLTOT 184 05/18/2018     (H) 05/18/2018    HDL 49 08/24/2018    HDL 46 05/18/2018    TRIGLYCERIDE 73 08/24/2018    TRIGLYCERIDE 138 05/18/2018    LDLPART 20.6 (L) 08/24/2018    LDLPART 1286 (H) 08/24/2018    SMLLDL 648 (H) 08/24/2018    LHDLPART 4.8 08/24/2018    LVLDLPT <1.5 08/24/2018    LDLCHOL 101 08/24/2018    HDLSIZE 8.9 08/24/2018    VLDLSIZE 46.0 08/24/2018    HDLPART 30.0 (L) 08/24/2018         Lab Results   Component Value Date    HBA1C 5.2 05/19/2017      Lab Results   Component Value Date    SODIUM 139 08/24/2018    POTASSIUM 5.2 08/24/2018    CHLORIDE 105 08/24/2018    CO2 28 08/24/2018    GLUCOSE 85 08/24/2018    BUN 16 08/24/2018    CREATININE 1.04 08/24/2018    IFAFRICA >60 08/24/2018    IFNOTAFR >60 08/24/2018            VASCULAR IMAGING:     CT CAC scoring 5/18/18  Calcium score:  155.8    The visualized portions of the lungs, mediastinum, and upper abdomen are within normal limits.   Impression       1.  There is definite, at least moderate atherosclerotic plaque burden.  2.  Nonobstructive coronary artery disease is highly likely and obstructive disease is possible.  3.  The patient's cardiovascular risk is moderately high.  4.  Aggressive risk factor modification is suggested.  5.  Further evaluation should be based upon global assessment of cardiovascular risk factors in addition to this test. Testing for inducible ischemia may be indicated depending upon the patient's global assessment.     ECHO - ordered/not completed due to cost     MPI - ordered/not completed due to cost     Medical Decision Making:  Today's Assessment / Status / Plan:      1. Pure hypercholesterolemia  COMP METABOLIC PANEL    LIPID PROFILE   2. Vitamin D deficiency     3. Gilbert syndrome       Patient Type: Secondary Prevention    Etiology of Established CVD if Present:  Moderate atherosclerotic plaque burden by CT CAC scoring with score = 155    Lipid Management: Qualifies for Statin Therapy Based on 2013 ACC/AHA Guidelines: yes  Calculated 10-Year Risk of ASCVD: N/A  Currently on Statin: Yes  NLA Risk Category:   Very high:  Hx of FHA  Tx threshold:  non-HDL-C >99, LDL-C >69  Tx goal: non-HDL-C <100,  LDL-C <70  (optional: apoB <80)  At goal:  No  Tx plan:   - continue crestor 40mg  - consider addition of zetia 10mg daily   - may qualify for PCSK9 inhibitor based upon LDL targes and Familial hyperlipidemia  - will review with Dr. Bloch due to age     Blood Pressure Management:Goal: ACC/AHA (2017) goal <130/80  Home BP at goal:  yes  Office BP at goal:  yes  Plan:   - continue home BP monitoring, reviewed correct technique:  Yes   - order 24h ABPM:  NO  - continue healthy lifestyle     Glycemic Status: Normal  - continue healthy lifestyle, monitor labs annually     Anti-Platelet/Anti-Coagulant Tx: N\A  - no indications at this age     Smoking: continued complete avoidance of all tobacco products      Physical Activity: advised to engage in aerobics, bicycling, running/ jogging, walking at least 150min/week     Weight Management and Nutrition: Dietary plan was discussed with patient at this visit including avoid excess calories and cholesterol, reviewed plate method .  Recommend 8-10lbs weight loss    Other:  1) CAD, moderate - indirect based upon elevated CT CAC score   - consider further cardiac testing once insurance coverage improves to include Echo and exercise treadmill test   - may ref to cardiology based upon results     2) bradycardia - low 40s, no symptoms   - consider zio patch or electrophys eval with cardiology     3) BMI = 28  - continued weight loss encouraged,  target = 185lb     Instructed to follow-up with PCP for remainder of adult medical needs: yes  We will partner with other providers in the management of established vascular disease and cardiometabolic risk factors.    Studies to Be Obtained:  None   Labs to Be Obtained:  CMP, lipid 4/2019     Follow up in:  6mo     Shahbaz Dominguez M.D.     Agree with above.  Given FH + cor calcium score would try to target LDLP close to or below 900-1000.  Would consider addition of zetia next visit.    Michael Bloch, MD  [facility]

## 2019-04-17 ENCOUNTER — HOSPITAL ENCOUNTER (OUTPATIENT)
Dept: LAB | Facility: MEDICAL CENTER | Age: 39
End: 2019-04-17
Attending: FAMILY MEDICINE

## 2019-04-17 DIAGNOSIS — E78.00 PURE HYPERCHOLESTEROLEMIA: ICD-10-CM

## 2019-04-17 LAB
ALBUMIN SERPL BCP-MCNC: 4.7 G/DL (ref 3.2–4.9)
ALBUMIN/GLOB SERPL: 2 G/DL
ALP SERPL-CCNC: 77 U/L (ref 30–99)
ALT SERPL-CCNC: 40 U/L (ref 2–50)
ANION GAP SERPL CALC-SCNC: 7 MMOL/L (ref 0–11.9)
AST SERPL-CCNC: 34 U/L (ref 12–45)
BILIRUB SERPL-MCNC: 2.2 MG/DL (ref 0.1–1.5)
BUN SERPL-MCNC: 20 MG/DL (ref 8–22)
CALCIUM SERPL-MCNC: 9.6 MG/DL (ref 8.5–10.5)
CHLORIDE SERPL-SCNC: 102 MMOL/L (ref 96–112)
CHOLEST SERPL-MCNC: 174 MG/DL (ref 100–199)
CO2 SERPL-SCNC: 26 MMOL/L (ref 20–33)
CREAT SERPL-MCNC: 1.02 MG/DL (ref 0.5–1.4)
FASTING STATUS PATIENT QL REPORTED: NORMAL
GLOBULIN SER CALC-MCNC: 2.4 G/DL (ref 1.9–3.5)
GLUCOSE SERPL-MCNC: 87 MG/DL (ref 65–99)
HDLC SERPL-MCNC: 49 MG/DL
LDLC SERPL CALC-MCNC: 109 MG/DL
POTASSIUM SERPL-SCNC: 4 MMOL/L (ref 3.6–5.5)
PROT SERPL-MCNC: 7.1 G/DL (ref 6–8.2)
SODIUM SERPL-SCNC: 135 MMOL/L (ref 135–145)
TRIGL SERPL-MCNC: 79 MG/DL (ref 0–149)

## 2019-04-17 PROCEDURE — 80053 COMPREHEN METABOLIC PANEL: CPT

## 2019-04-17 PROCEDURE — 80061 LIPID PANEL: CPT

## 2019-04-17 PROCEDURE — 36415 COLL VENOUS BLD VENIPUNCTURE: CPT

## 2019-04-19 ENCOUNTER — OFFICE VISIT (OUTPATIENT)
Dept: VASCULAR LAB | Facility: MEDICAL CENTER | Age: 39
End: 2019-04-19
Attending: FAMILY MEDICINE

## 2019-04-19 VITALS
HEART RATE: 45 BPM | BODY MASS INDEX: 28.56 KG/M2 | HEIGHT: 71 IN | SYSTOLIC BLOOD PRESSURE: 122 MMHG | DIASTOLIC BLOOD PRESSURE: 63 MMHG | WEIGHT: 204 LBS

## 2019-04-19 DIAGNOSIS — R00.1 BRADYCARDIA: ICD-10-CM

## 2019-04-19 DIAGNOSIS — R93.1 AGATSTON CORONARY ARTERY CALCIUM SCORE BETWEEN 100 AND 199: ICD-10-CM

## 2019-04-19 DIAGNOSIS — E80.4 GILBERT SYNDROME: ICD-10-CM

## 2019-04-19 DIAGNOSIS — E78.01 FAMILIAL HYPERCHOLESTEROLEMIA: ICD-10-CM

## 2019-04-19 PROCEDURE — 99212 OFFICE O/P EST SF 10 MIN: CPT | Performed by: FAMILY MEDICINE

## 2019-04-19 PROCEDURE — 99214 OFFICE O/P EST MOD 30 MIN: CPT | Performed by: FAMILY MEDICINE

## 2019-04-19 RX ORDER — EZETIMIBE 10 MG/1
10 TABLET ORAL DAILY
Qty: 90 TAB | Refills: 3 | Status: SHIPPED | OUTPATIENT
Start: 2019-04-19 | End: 2020-04-13

## 2019-04-19 ASSESSMENT — ENCOUNTER SYMPTOMS
SHORTNESS OF BREATH: 0
HEMOPTYSIS: 0
COUGH: 0
WEAKNESS: 0
MYALGIAS: 0
DOUBLE VISION: 0
DIARRHEA: 0
WHEEZING: 0
VOMITING: 0
ABDOMINAL PAIN: 0
FOCAL WEAKNESS: 0
NAUSEA: 0
DIZZINESS: 0
INSOMNIA: 0
BLURRED VISION: 0
SORE THROAT: 0
CHILLS: 0
BRUISES/BLEEDS EASILY: 0
NERVOUS/ANXIOUS: 0
FEVER: 0
HEADACHES: 0
TREMORS: 0
PALPITATIONS: 0
SEIZURES: 0
DEPRESSION: 0
BLOOD IN STOOL: 0
ORTHOPNEA: 0

## 2019-04-19 NOTE — PATIENT INSTRUCTIONS
"1) start zetia 10mg   2)  Start cholest-off   3) Fourier and Odyssey - review studies show reduction in CV outcomes  4) repeat labs in 2mo     General healthly nutrition advice:  - the USDA food pattern (https://www.cnpp.usda.gov/USDAFoodPatterns)  - plate method (https://www.choosemyplate.gov/)  - consume diet that emphasizes intake of vegetables, fruits, and whole grains,  - use low fat diary products, poultry, fish, legumes, nontropical vegetable oils, nuts  - limit intake of sweets, sugar-sweetned beverages, and red meats   - reduce saturated and trans fats to <6% of your daily calories   - consume no more than 2,400mg of sodium daily (look at food labels) or if you have high BP then reduce to no more than 1,500mg of sodium daily     BP lowering diet:   - DASH diet (https://www.heart.org/en/health-topics/high-blood-pressure/changes-you-can-make-to-manage-high-blood-pressure/managing-blood-pressure-with-a-heart-healthy-diet)    Diabetes diet:  - visit diabetes.org to review \"food and fitness\" section and \"Create your plate\" for plate method education (http://www.diabetes.org/food-and-fitness/)     Cholesterol-reducing diets:   - AHA diet  (http://www.heart.org/en/healthy-living/healthy-eating/eat-smart/nutrition-basics/aha-diet-and-lifestyle-recommendations)   - Mediterranean diet (http://www.heart.org/en/healthy-living/healthy-eating/eat-smart/nutrition-basics/mediterranean-diet)   - lowering triglycerides: (http://my.americanheart.org/idc/groups/ahamah-public/@wc/@sop/@[facility]/documents/downloadable/[facility]_425988.pdf)     "

## 2019-04-19 NOTE — PROGRESS NOTES
FOLLOW-UP VASCULAR MED VISIT  Subjective:   Jacky Paz is a 38 y.o. male who presents today 4/19/19 for   Chief Complaint   Patient presents with   • Follow-Up     Familial Hypercholesterolemia; labs   Initially referred by PCP (LEONARDO Anderson) for evaluation and mgmt of HLD    HPI:    Jacky Paz with pmhx with FH.        Hyperlipidemia:  Known FH.  Baseline cholesterol off tx TC = 399 when age 11. Untreated has been 270s.  Stable, no current concerns  Current treatment: High intensity statin   rosuva 40mg   Myalgias? No  Other adverse drug reactions? No  Lipid profile:   LDL (mg/dL)   Date Value   04/17/2019 109 (H)   05/18/2018 110 (H)     HDL (mg/dL)   Date Value   04/17/2019 49   08/24/2018 49   05/18/2018 46     BP: no current HTN dx or HTN-associated sx     Antiplatelet/anticoagulation: No     Pertinent HTN hx:   Age at HTN dx:  No dx   Past HTN medications: none  HTN crises:  No prior hospitalization or crises   ASCVD risk factors:     DM: No   CKD:  No  Lifestyle factors:     High salt: No   EtOH: No  Interfering substances:     NSAIDs: No    Decongestants. No    ASCVD calculator (contraindicated if ASCVD+, STV1I-6)   10yr-risk calc: n/a , too young    Lifetime-risk calc: n/a    Clinical evidence of ASCVD:     1) hx of MI/ACS:  No   2) coronary or other revascularization procedure: No   3) TIA/ischemic CVA: No   4) PAD (including ODELL <0.9): No   5) documented (CAD, Renal athero, AA due to athero, carotid plaque >50% stenosis: No    Major RFs:     1) Age (Men>44, women>54):  no   2) Fhx early CAD (<55 men, <65 women):  yes   3) cigarette smoking:  No   4) high BP >139/89 or on tx: no   5) Low HDL-C (<40 men, <50 women):  No    Family History   Problem Relation Age of Onset   • Hyperlipidemia Mother         FH, [id number] untreated   • Heart Disease Mother         4vCABG in 40s, heart valve replacement   • Hypertension Mother    • Other Mother         carotid artery disease, bilat   • Hyperlipidemia  Father    • Diabetes Father    • Hypertension Father    • Hyperlipidemia Sister         FH, untreated NM=063   • Heart Attack Maternal Grandfather         60s,    • Hyperlipidemia Maternal Grandfather    • Heart Attack Paternal Grandfather          84   • Hyperlipidemia Sister         FH,  due to biking accident    • Heart Disease Cousin         maternal side, 5vCABG in 30s    • Hyperlipidemia Son         KY=565, dx age 11       History   Smoking Status   • Never Smoker   Smokeless Tobacco   • Never Used     Social History   Substance Use Topics   • Smoking status: Never Smoker   • Smokeless tobacco: Never Used   • Alcohol use No     Outpatient Encounter Prescriptions as of 2019   Medication Sig Dispense Refill   • ezetimibe (ZETIA) 10 MG Tab Take 1 Tab by mouth every day for 360 days. 90 Tab 3   • rosuvastatin (CRESTOR) 40 MG tablet Take 1 Tab by mouth every day for 360 days. 90 Tab 3     No facility-administered encounter medications on file as of 2019.      No Known Allergies  DIET AND EXERCISE:  Weight Change: no changes   BMI Readings from Last 4 Encounters:   19 28.45 kg/m²   10/11/18 28.45 kg/m²   18 28.73 kg/m²   18 28.81 kg/m²     Diet: low fat  Exercise: moderate regular exercise program     Review of Systems   Constitutional: Negative for chills, fever and malaise/fatigue.   HENT: Negative for nosebleeds, sore throat and tinnitus.    Eyes: Negative for blurred vision and double vision.   Respiratory: Negative for cough, hemoptysis, shortness of breath and wheezing.    Cardiovascular: Negative for chest pain, palpitations, orthopnea and leg swelling.   Gastrointestinal: Negative for abdominal pain, blood in stool, diarrhea, melena, nausea and vomiting.   Genitourinary: Negative for hematuria.   Musculoskeletal: Negative for joint pain and myalgias.   Skin: Negative for itching and rash.   Neurological: Negative for dizziness, tremors, focal weakness, seizures,  "weakness and headaches.   Endo/Heme/Allergies: Does not bruise/bleed easily.   Psychiatric/Behavioral: Negative for depression. The patient is not nervous/anxious and does not have insomnia.       Objective:     Vitals:    04/19/19 1029   BP: 122/63   BP Location: Left arm   Patient Position: Sitting   BP Cuff Size: Adult   Pulse: (!) 45   Weight: 92.5 kg (204 lb)   Height: 1.803 m (5' 11\")      BP Readings from Last 4 Encounters:   04/19/19 122/63   10/11/18 136/63   08/24/18 122/66   06/21/18 110/60     Body mass index is 28.45 kg/m².     Physical Exam   Constitutional: He is oriented to person, place, and time. He appears well-developed and well-nourished. He is cooperative. No distress.   HENT:   Head: Normocephalic and atraumatic.   Mouth/Throat: Oropharynx is clear and moist and mucous membranes are normal.   Eyes: Pupils are equal, round, and reactive to light. Conjunctivae are normal.   Neck: Trachea normal and normal range of motion. Neck supple. Normal carotid pulses and no JVD present. Carotid bruit is not present. No thyromegaly present.   Cardiovascular: Normal rate, regular rhythm, normal heart sounds and intact distal pulses.  Exam reveals no gallop and no friction rub.    No murmur heard.  Pulses:       Carotid pulses are 2+ on the right side, and 2+ on the left side.       Radial pulses are 2+ on the right side, and 2+ on the left side.        Dorsalis pedis pulses are 2+ on the right side, and 2+ on the left side.        Posterior tibial pulses are 2+ on the right side, and 2+ on the left side.   Edema:    RLE: none     LLE: none   Spider telangectasia:       RLE:  None      LLE: none   Varicosities:         RLE: none      LLE: none   Corona phlebectatica:      RLE:  None        LLE:  None   Cording:         RLE:  None     LLE: None    Pulmonary/Chest: Effort normal and breath sounds normal. No respiratory distress.   Abdominal: Soft. Bowel sounds are normal. He exhibits no distension and no mass. " There is no hepatosplenomegaly. There is no tenderness. There is no rebound and no guarding.   Musculoskeletal: He exhibits no edema.   Lymphadenopathy:     He has no cervical adenopathy.   Neurological: He is alert and oriented to person, place, and time. No cranial nerve deficit. Coordination and gait normal.   Skin: Skin is warm and dry. He is not diaphoretic. No cyanosis. No pallor. Nails show no clubbing.   Psychiatric: He has a normal mood and affect.     Lab Results   Component Value Date    CHOLSTRLTOT 174 04/17/2019     (H) 04/17/2019    HDL 49 04/17/2019    TRIGLYCERIDE 79 04/17/2019    LDLPART 20.6 (L) 08/24/2018    LDLPART 1286 (H) 08/24/2018    SMLLDL 648 (H) 08/24/2018    LHDLPART 4.8 08/24/2018    LVLDLPT <1.5 08/24/2018    LDLCHOL 101 08/24/2018    HDLSIZE 8.9 08/24/2018    VLDLSIZE 46.0 08/24/2018    HDLPART 30.0 (L) 08/24/2018         Lab Results   Component Value Date    HBA1C 5.2 05/19/2017      Lab Results   Component Value Date    SODIUM 135 04/17/2019    POTASSIUM 4.0 04/17/2019    CHLORIDE 102 04/17/2019    CO2 26 04/17/2019    GLUCOSE 87 04/17/2019    BUN 20 04/17/2019    CREATININE 1.02 04/17/2019    IFAFRICA >60 04/17/2019    IFNOTAFR >60 04/17/2019          VASCULAR IMAGING:     CT CAC scoring 5/18/18  Calcium score:  155.8    The visualized portions of the lungs, mediastinum, and upper abdomen are within normal limits.   Impression       1.  There is definite, at least moderate atherosclerotic plaque burden.  2.  Nonobstructive coronary artery disease is highly likely and obstructive disease is possible.  3.  The patient's cardiovascular risk is moderately high.  4.  Aggressive risk factor modification is suggested.  5.  Further evaluation should be based upon global assessment of cardiovascular risk factors in addition to this test. Testing for inducible ischemia may be indicated depending upon the patient's global assessment.     ECHO/MPI - ordered/not completed due to cost      Medical Decision Making:  Today's Assessment / Status / Plan:     1. Familial hypercholesterolemia  LipoFit by NMR    Comp Metabolic Panel    APOLIPOPROTEIN B   2. Gilbert syndrome     3. Bradycardia     4. Agatston coronary artery calcium score between 100 and 199       Patient Type: Secondary Prevention    Etiology of Established CVD if Present:  Moderate atherosclerotic plaque burden by CT CAC scoring with score = 155    Lipid Management: Qualifies for Statin Therapy Based on 2013 ACC/AHA Guidelines: yes  Calculated 10-Year Risk of ASCVD: N/A  Currently on Statin: Yes  hsCRP low risk = 0.5  CAC score = 155   8/2018: LDLP 1286, small LDLP 648 - elevated   NLA Risk Category:   Very high:  Hx of FHA  Tx threshold:  non-HDL-C >99, LDL-C >69  Tx goal: non-HDL-C <100,  LDL-C <70  (optional: apoB <80)  Target LDL-P 900-100  At goal:  No  Tx plan:   - continue crestor 40mg  - start zetia 10mg daily   - may qualify for PCSK9 inhibitor based upon LDL targes and Familial hyperlipidemia  - update labs in 6 weeks     Blood Pressure Management:Goal: ACC/AHA (2017) goal <130/80  Home BP at goal:  yes  Office BP at goal:  yes  Plan:   - continue home BP monitoring, reviewed correct technique:  Yes   - order 24h ABPM:  NO  - continue healthy lifestyle     Glycemic Status: Normal  - continue healthy lifestyle, monitor labs annually     Anti-Platelet/Anti-Coagulant Tx: N\A  - no indications at this age     Smoking: continued complete avoidance of all tobacco products      Physical Activity: advised to engage in aerobics, bicycling, running/ jogging, walking at least 150min/week     Weight Management and Nutrition: Dietary plan was discussed with patient at this visit including avoid excess calories and cholesterol, reviewed plate method .  Recommend 8-10lbs weight loss    Other:  1) CAD, moderate - indirect based upon elevated CT CAC score   - consider further cardiac testing once insurance coverage improves to include Echo and  exercise treadmill test   - may ref to cardiology based upon results     2) bradycardia - low 40s, no symptoms.    - consider zio patch or electrophys eval with cardiology     3) BMI = 28  - continued weight loss encouraged, target = 185lb     4) Gilbert's - stable total bilirubin levels   - monitor over time since on statin     Instructed to follow-up with PCP for remainder of adult medical needs: yes  We will partner with other providers in the management of established vascular disease and cardiometabolic risk factors.    Studies to Be Obtained:    Labs to Be Obtained:      Follow up in:       Shahbaz Dominguez M.D.

## 2019-04-22 PROBLEM — R93.1 AGATSTON CORONARY ARTERY CALCIUM SCORE BETWEEN 100 AND 199: Status: ACTIVE | Noted: 2019-04-22

## 2019-04-22 PROBLEM — R00.1 BRADYCARDIA: Status: ACTIVE | Noted: 2019-04-22

## 2019-06-07 ENCOUNTER — HOSPITAL ENCOUNTER (OUTPATIENT)
Dept: LAB | Facility: MEDICAL CENTER | Age: 39
End: 2019-06-07
Attending: FAMILY MEDICINE

## 2019-06-07 DIAGNOSIS — E78.01 FAMILIAL HYPERCHOLESTEROLEMIA: ICD-10-CM

## 2019-06-07 LAB
ALBUMIN SERPL BCP-MCNC: 4.6 G/DL (ref 3.2–4.9)
ALBUMIN/GLOB SERPL: 1.7 G/DL
ALP SERPL-CCNC: 78 U/L (ref 30–99)
ALT SERPL-CCNC: 40 U/L (ref 2–50)
ANION GAP SERPL CALC-SCNC: 6 MMOL/L (ref 0–11.9)
AST SERPL-CCNC: 30 U/L (ref 12–45)
BILIRUB SERPL-MCNC: 2 MG/DL (ref 0.1–1.5)
BUN SERPL-MCNC: 14 MG/DL (ref 8–22)
CALCIUM SERPL-MCNC: 9.8 MG/DL (ref 8.5–10.5)
CHLORIDE SERPL-SCNC: 105 MMOL/L (ref 96–112)
CO2 SERPL-SCNC: 27 MMOL/L (ref 20–33)
CREAT SERPL-MCNC: 1.02 MG/DL (ref 0.5–1.4)
FASTING STATUS PATIENT QL REPORTED: NORMAL
GLOBULIN SER CALC-MCNC: 2.7 G/DL (ref 1.9–3.5)
GLUCOSE SERPL-MCNC: 91 MG/DL (ref 65–99)
POTASSIUM SERPL-SCNC: 4 MMOL/L (ref 3.6–5.5)
PROT SERPL-MCNC: 7.3 G/DL (ref 6–8.2)
SODIUM SERPL-SCNC: 138 MMOL/L (ref 135–145)

## 2019-06-07 PROCEDURE — 82172 ASSAY OF APOLIPOPROTEIN: CPT

## 2019-06-07 PROCEDURE — 80061 LIPID PANEL: CPT

## 2019-06-07 PROCEDURE — 36415 COLL VENOUS BLD VENIPUNCTURE: CPT

## 2019-06-07 PROCEDURE — 80053 COMPREHEN METABOLIC PANEL: CPT

## 2019-06-07 PROCEDURE — 83704 LIPOPROTEIN BLD QUAN PART: CPT

## 2019-06-08 LAB — APO B100 SERPL-MCNC: 68 MG/DL (ref 55–140)

## 2019-06-10 LAB
CHOLEST SERPL-MCNC: 133 MG/DL
FASTING STATUS PATIENT QL REPORTED: NORMAL
HDL PARTICAL NO Q4363: 30.7 UMOL/L
HDL SIZE Q4361: 9.1 NM
HDLC SERPL-MCNC: 47 MG/DL (ref 40–59)
HLD.LARGE SERPL-SCNC: 5.8 UMOL/L
L VLDL PART NO Q4357: 2.1 NMOL/L
LDL SERPL QN: 20.7 NM
LDL SERPL-SCNC: 971 NMOL/L
LDL SMALL SERPL-SCNC: 522 NMOL/L
LDLC SERPL CALC-MCNC: 71 MG/DL
PATHOLOGY STUDY: ABNORMAL
TRIGL SERPL-MCNC: 74 MG/DL (ref 30–149)
VLDL SIZE Q4362: 48.4 NM

## 2019-06-14 ENCOUNTER — OFFICE VISIT (OUTPATIENT)
Dept: VASCULAR LAB | Facility: MEDICAL CENTER | Age: 39
End: 2019-06-14
Attending: INTERNAL MEDICINE

## 2019-06-14 VITALS
WEIGHT: 216.2 LBS | BODY MASS INDEX: 30.27 KG/M2 | DIASTOLIC BLOOD PRESSURE: 58 MMHG | SYSTOLIC BLOOD PRESSURE: 118 MMHG | HEART RATE: 49 BPM | HEIGHT: 71 IN

## 2019-06-14 DIAGNOSIS — E80.4 GILBERT SYNDROME: ICD-10-CM

## 2019-06-14 DIAGNOSIS — E78.01 FAMILIAL HYPERCHOLESTEROLEMIA: ICD-10-CM

## 2019-06-14 DIAGNOSIS — R00.1 BRADYCARDIA: ICD-10-CM

## 2019-06-14 DIAGNOSIS — R93.1 AGATSTON CORONARY ARTERY CALCIUM SCORE BETWEEN 100 AND 199: ICD-10-CM

## 2019-06-14 PROCEDURE — 99214 OFFICE O/P EST MOD 30 MIN: CPT | Performed by: FAMILY MEDICINE

## 2019-06-14 ASSESSMENT — ENCOUNTER SYMPTOMS
DOUBLE VISION: 0
CHILLS: 0
BRUISES/BLEEDS EASILY: 0
SHORTNESS OF BREATH: 0
TREMORS: 0
DIARRHEA: 0
DIZZINESS: 0
DEPRESSION: 0
WEAKNESS: 0
SEIZURES: 0
VOMITING: 0
ABDOMINAL PAIN: 0
NAUSEA: 0
PALPITATIONS: 0
HEADACHES: 0
BLURRED VISION: 0
INSOMNIA: 0
WHEEZING: 0
BLOOD IN STOOL: 0
MYALGIAS: 0
ORTHOPNEA: 0
HEMOPTYSIS: 0
NERVOUS/ANXIOUS: 0
COUGH: 0
FOCAL WEAKNESS: 0
SORE THROAT: 0
FEVER: 0

## 2019-06-14 NOTE — PATIENT INSTRUCTIONS
1) check labs in 3 months   2) continue adjusting diet and exercise     Physical activity:  - engage in moderate to vigorous physical activity such as brisk walking, swimming, cycling, >150 minutes per week at 30-40 minutes per session, 3 to 5 times weekly or as directed at today's visit     General healthly nutrition advice:  - the USDA food pattern (https://www.cnpp.usda.gov/USDAFoodPatterns)  - plate method (https://www.[employer] Softwaremyplate.gov/)  - consume diet that emphasizes intake of vegetables, fruits, and whole grains,  - use low fat diary products, poultry, fish, legumes, nontropical vegetable oils, nuts  - limit intake of sweets, sugar-sweetned beverages, and red meats   - reduce saturated and trans fats to <6% of your daily calories   - consume no more than 2,400mg of sodium daily (look at food labels) or if you have high BP then reduce to no more than 1,500mg of sodium daily     BP lowering diet:   - DASH diet (https://www.heart.org/en/health-topics/high-blood-pressure/changes-you-can-make-to-manage-high-blood-pressure/managing-blood-pressure-with-a-heart-healthy-diet)    Cholesterol-reducing diets:   - AHA diet  (http://www.heart.org/en/healthy-living/healthy-eating/eat-smart/nutrition-basics/aha-diet-and-lifestyle-recommendations)   - Mediterranean diet (http://www.heart.org/en/healthy-living/healthy-eating/eat-smart/nutrition-basics/mediterranean-diet)   - lowering triglycerides: (http://my.americanheart.org/idc/groups/ahamah-public/@wc/@sop/@[facility]/documents/downloadable/[facility]_425988.pdf)

## 2019-06-14 NOTE — PROGRESS NOTES
FOLLOW-UP VASCULAR MED VISIT  Subjective:   Jacky Paz is a 39 y.o. male who presents today 6/14/19 for   Chief Complaint   Patient presents with   • Follow-Up     FH, meds    Initially referred by PCP (LEONARDO Anderson) for evaluation and mgmt of HLD    HPI:      Hyperlipidemia:    No concerns, started zetia at last visit   Known FH.  Baseline cholesterol off tx TC = 399 when age 11. Untreated has been 270s.  Current treatment: High intensity statin   rosuva 40mg, zetia 10mg, plant sterol.   Myalgias? No  Other adverse drug reactions? No  Lipid profile:   LDL (mg/dL)   Date Value   04/17/2019 109 (H)   05/18/2018 110 (H)     HDL (mg/dL)   Date Value   04/17/2019 49   08/24/2018 49   05/18/2018 46     BP: no current HTN dx or HTN-associated sx     Antiplatelet/anticoagulation: No     Pertinent HTN hx:   Age at HTN dx:  No dx   Past HTN medications: none  HTN crises:  No prior hospitalization or crises   ASCVD risk factors:     DM: No   CKD:  No  Lifestyle factors:     High salt: No   EtOH: No  Interfering substances:     NSAIDs: No    Decongestants. No    ASCVD calculator (contraindicated if ASCVD+, [id number]-7)   10yr-risk calc: n/a , too young    Lifetime-risk calc: n/a    Clinical evidence of ASCVD:     1) hx of MI/ACS:  No   2) coronary or other revascularization procedure: No   3) TIA/ischemic CVA: No   4) PAD (including ODELL <0.9): No   5) documented (CAD, Renal athero, AA due to athero, carotid plaque >50% stenosis: No    Major RFs:     1) Age (Men>44, women>54):  no   2) Fhx early CAD (<55 men, <65 women):  yes   3) cigarette smoking:  No   4) high BP >139/89 or on tx: no   5) Low HDL-C (<40 men, <50 women):  No    Family History   Problem Relation Age of Onset   • Hyperlipidemia Mother         FH, MI=282 untreated   • Heart Disease Mother         4vCABG in 40s, heart valve replacement   • Hypertension Mother    • Other Mother         carotid artery disease, bilat   • Hyperlipidemia Father    • Diabetes  Father    • Hypertension Father    • Hyperlipidemia Sister         FH, untreated FT=180   • Heart Attack Maternal Grandfather         60s,    • Hyperlipidemia Maternal Grandfather    • Heart Attack Paternal Grandfather          84   • Hyperlipidemia Sister         FH,  due to biking accident    • Heart Disease Cousin         maternal side, 5vCABG in 30s    • Hyperlipidemia Son         SI=228, dx age 11       History   Smoking Status   • Never Smoker   Smokeless Tobacco   • Never Used     Social History   Substance Use Topics   • Smoking status: Never Smoker   • Smokeless tobacco: Never Used   • Alcohol use No     Outpatient Encounter Prescriptions as of 2019   Medication Sig Dispense Refill   • ezetimibe (ZETIA) 10 MG Tab Take 1 Tab by mouth every day for 360 days. 90 Tab 3   • rosuvastatin (CRESTOR) 40 MG tablet Take 1 Tab by mouth every day for 360 days. 90 Tab 3   • [DISCONTINUED] Plant Sterols and Stanols (CHOLESTOFF PO) Take  by mouth.       No facility-administered encounter medications on file as of 2019.      No Known Allergies  DIET AND EXERCISE:  Weight Change: no changes   BMI Readings from Last 5 Encounters:   19 30.15 kg/m²   19 28.45 kg/m²   10/11/18 28.45 kg/m²   18 28.73 kg/m²   18 28.81 kg/m²      Diet: low fat  Exercise: moderate regular exercise program, 3mi 3x/week      Review of Systems   Constitutional: Negative for chills, fever and malaise/fatigue.   HENT: Negative for nosebleeds, sore throat and tinnitus.    Eyes: Negative for blurred vision and double vision.   Respiratory: Negative for cough, hemoptysis, shortness of breath and wheezing.    Cardiovascular: Negative for chest pain, palpitations, orthopnea and leg swelling.   Gastrointestinal: Negative for abdominal pain, blood in stool, diarrhea, melena, nausea and vomiting.   Genitourinary: Negative for hematuria.   Musculoskeletal: Negative for joint pain and myalgias.   Skin: Negative for  "itching and rash.   Neurological: Negative for dizziness, tremors, focal weakness, seizures, weakness and headaches.   Endo/Heme/Allergies: Does not bruise/bleed easily.   Psychiatric/Behavioral: Negative for depression. The patient is not nervous/anxious and does not have insomnia.       Objective:     Vitals:    06/14/19 1035   BP: 118/58   BP Location: Left arm   Patient Position: Sitting   BP Cuff Size: Adult   Pulse: (!) 49   Weight: 98.1 kg (216 lb 3.2 oz)   Height: 1.803 m (5' 11\")      BP Readings from Last 4 Encounters:   06/14/19 118/58   04/19/19 122/63   10/11/18 136/63   08/24/18 122/66     Body mass index is 30.15 kg/m².     Physical Exam   Constitutional: He is oriented to person, place, and time. He appears well-developed and well-nourished. He is cooperative. No distress.   HENT:   Head: Normocephalic and atraumatic.   Mouth/Throat: Oropharynx is clear and moist and mucous membranes are normal.   Eyes: Pupils are equal, round, and reactive to light. Conjunctivae are normal.   Neck: Trachea normal and normal range of motion. Neck supple. Normal carotid pulses and no JVD present. Carotid bruit is not present. No thyromegaly present.   Cardiovascular: Normal rate, regular rhythm, normal heart sounds and intact distal pulses.  Exam reveals no gallop and no friction rub.    No murmur heard.  Pulses:       Carotid pulses are 2+ on the right side, and 2+ on the left side.       Radial pulses are 2+ on the right side, and 2+ on the left side.        Dorsalis pedis pulses are 2+ on the right side, and 2+ on the left side.        Posterior tibial pulses are 2+ on the right side, and 2+ on the left side.   Edema:    RLE: none     LLE: none   Spider telangectasia:       RLE:  None      LLE: none   Varicosities:         RLE: none      LLE: none   Corona phlebectatica:      RLE:  None        LLE:  None   Cording:         RLE:  None     LLE: None    Pulmonary/Chest: Effort normal and breath sounds normal. No " respiratory distress.   Abdominal: Soft. Bowel sounds are normal. He exhibits no distension and no mass. There is no hepatosplenomegaly. There is no tenderness. There is no rebound and no guarding.   Musculoskeletal: He exhibits no edema.   Lymphadenopathy:     He has no cervical adenopathy.   Neurological: He is alert and oriented to person, place, and time. No cranial nerve deficit. Coordination and gait normal.   Skin: Skin is warm and dry. He is not diaphoretic. No cyanosis. No pallor. Nails show no clubbing.   Psychiatric: He has a normal mood and affect.     Lab Results   Component Value Date    CHOLSTRLTOT 133 06/07/2019    CHOLSTRLTOT 174 04/17/2019     (H) 04/17/2019    HDL 47 06/07/2019    HDL 49 04/17/2019    TRIGLYCERIDE 74 06/07/2019    TRIGLYCERIDE 79 04/17/2019    LDLPART 20.7 06/07/2019    LDLPART 971 06/07/2019    SMLLDL 522 06/07/2019    LHDLPART 5.8 06/07/2019    LVLDLPT 2.1 06/07/2019    LDLCHOL 71 06/07/2019    HDLSIZE 9.1 06/07/2019    VLDLSIZE 48.4 (H) 06/07/2019    HDLPART 30.7 (L) 06/07/2019             Lab Results   Component Value Date    SODIUM 138 06/07/2019    POTASSIUM 4.0 06/07/2019    CHLORIDE 105 06/07/2019    CO2 27 06/07/2019    GLUCOSE 91 06/07/2019    BUN 14 06/07/2019    CREATININE 1.02 06/07/2019    IFAFRICA >60 06/07/2019    IFNOTAFR >60 06/07/2019          VASCULAR IMAGING:     CT CAC scoring 5/18/18  Calcium score:  155.8    The visualized portions of the lungs, mediastinum, and upper abdomen are within normal limits.   Impression       1.  There is definite, at least moderate atherosclerotic plaque burden.  2.  Nonobstructive coronary artery disease is highly likely and obstructive disease is possible.  3.  The patient's cardiovascular risk is moderately high.  4.  Aggressive risk factor modification is suggested.  5.  Further evaluation should be based upon global assessment of cardiovascular risk factors in addition to this test. Testing for inducible ischemia may  be indicated depending upon the patient's global assessment.     ECHO/MPI - ordered/not completed due to cost     Medical Decision Making:  Today's Assessment / Status / Plan:     1. Familial hypercholesterolemia  Comp Metabolic Panel    Lipid Profile    CANCELED: APOLIPOPROTEIN B   2. Gilbert syndrome     3. Agatston coronary artery calcium score between 100 and 199     4. Bradycardia       Patient Type: Secondary Prevention    Etiology of Established CVD if Present:  Moderate atherosclerotic plaque burden by CT CAC scoring with score = 155    Lipid Management: Qualifies for Statin Therapy Based on 2013 ACC/AHA Guidelines: yes  Calculated 10-Year Risk of ASCVD: N/A  Currently on Statin: Yes  hsCRP low risk = 0.5  CAC score = 155   8/2018: LDLP 1286, small LDLP 648 - elevated   9/2019: LDLP 971, small LDLP 522  Concordant LDLP/LDLC ratio = 12  NLA Risk Category:   Very high:  Hx of FHA  Tx threshold:  non-HDL-C >99, LDL-C >69  Tx goal: non-HDL-C <100,  LDL-C <70  (optional: apoB <80)  Target LDL-P <1000  At goal:  Yes for LDLP, LDLC and apoB met   Tx plan:   - continue crestor 40mg  - continue zetia 10mg daily   - may qualify for PCSK9 inhibitor based upon LDL targes and Familial hyperlipidemia  - update labs in 3mo with standard lipid and apoB only     Blood Pressure Management:Goal: ACC/AHA (2017) goal <130/80  Home BP at goal:  yes  Office BP at goal:  yes  Plan:   - continue home BP monitoring, reviewed correct technique:  Yes   - order 24h ABPM:  NO  - continue healthy lifestyle     Glycemic Status: Normal  - continue healthy lifestyle, monitor labs annually     Anti-Platelet/Anti-Coagulant Tx: N\A  - no indications at this age     Smoking: continued complete avoidance of all tobacco products      Physical Activity: advised to engage in aerobics, bicycling, running/ jogging, walking at least 150min/week     Weight Management and Nutrition: Dietary plan was discussed with patient at this visit including avoid  excess calories and cholesterol, reviewed plate method .  Recommend 8-10lbs weight loss    Other:    1) CAD, moderate - indirect based upon elevated CT CAC score   - consider further cardiac testing once insurance coverage improves to include Echo and exercise treadmill test   - may ref to cardiology based upon results     2) bradycardia - low 40s, no symptoms.    - consider zio patch or electrophys eval with cardiology     3) BMI = 30  - focus on adjusting exercise and diet every 4-6 weeks     4) Gilbert's - stable total bilirubin levels   - monitor over time since on statin, defer primary mgmt to PCP      Instructed to follow-up with PCP for remainder of adult medical needs: yes  We will partner with other providers in the management of established vascular disease and cardiometabolic risk factors.    Studies to Be Obtained:  None   Labs to Be Obtained:  Lipid, cmp in 3mo, then Q6-12mo if stable    Follow up in:   6mo with me     Shahbaz Dominguez M.D.

## 2019-10-04 DIAGNOSIS — E78.00 PURE HYPERCHOLESTEROLEMIA: ICD-10-CM

## 2019-10-04 RX ORDER — ROSUVASTATIN CALCIUM 40 MG/1
40 TABLET, COATED ORAL DAILY
Qty: 90 TAB | Refills: 3 | Status: SHIPPED | OUTPATIENT
Start: 2019-10-04 | End: 2020-09-28

## 2019-12-13 ENCOUNTER — APPOINTMENT (OUTPATIENT)
Dept: VASCULAR LAB | Facility: MEDICAL CENTER | Age: 39
End: 2019-12-13

## 2020-01-03 ENCOUNTER — HOSPITAL ENCOUNTER (OUTPATIENT)
Dept: LAB | Facility: MEDICAL CENTER | Age: 40
End: 2020-01-03
Attending: FAMILY MEDICINE

## 2020-01-03 DIAGNOSIS — E78.01 FAMILIAL HYPERCHOLESTEROLEMIA: ICD-10-CM

## 2020-01-03 LAB
ALBUMIN SERPL BCP-MCNC: 4.4 G/DL (ref 3.2–4.9)
ALBUMIN/GLOB SERPL: 1.4 G/DL
ALP SERPL-CCNC: 69 U/L (ref 30–99)
ALT SERPL-CCNC: 61 U/L (ref 2–50)
ANION GAP SERPL CALC-SCNC: 9 MMOL/L (ref 0–11.9)
AST SERPL-CCNC: 40 U/L (ref 12–45)
BILIRUB SERPL-MCNC: 2.5 MG/DL (ref 0.1–1.5)
BUN SERPL-MCNC: 17 MG/DL (ref 8–22)
CALCIUM SERPL-MCNC: 9.5 MG/DL (ref 8.5–10.5)
CHLORIDE SERPL-SCNC: 104 MMOL/L (ref 96–112)
CHOLEST SERPL-MCNC: 215 MG/DL (ref 100–199)
CO2 SERPL-SCNC: 26 MMOL/L (ref 20–33)
CREAT SERPL-MCNC: 1 MG/DL (ref 0.5–1.4)
FASTING STATUS PATIENT QL REPORTED: NORMAL
GLOBULIN SER CALC-MCNC: 3.2 G/DL (ref 1.9–3.5)
GLUCOSE SERPL-MCNC: 84 MG/DL (ref 65–99)
HDLC SERPL-MCNC: 46 MG/DL
LDLC SERPL CALC-MCNC: 132 MG/DL
POTASSIUM SERPL-SCNC: 4 MMOL/L (ref 3.6–5.5)
PROT SERPL-MCNC: 7.6 G/DL (ref 6–8.2)
SODIUM SERPL-SCNC: 139 MMOL/L (ref 135–145)
TRIGL SERPL-MCNC: 185 MG/DL (ref 0–149)

## 2020-01-03 PROCEDURE — 80053 COMPREHEN METABOLIC PANEL: CPT

## 2020-01-03 PROCEDURE — 36415 COLL VENOUS BLD VENIPUNCTURE: CPT

## 2020-01-03 PROCEDURE — 80061 LIPID PANEL: CPT

## 2020-01-10 ENCOUNTER — OFFICE VISIT (OUTPATIENT)
Dept: VASCULAR LAB | Facility: MEDICAL CENTER | Age: 40
End: 2020-01-10
Attending: FAMILY MEDICINE

## 2020-01-10 VITALS
WEIGHT: 221.4 LBS | SYSTOLIC BLOOD PRESSURE: 125 MMHG | DIASTOLIC BLOOD PRESSURE: 62 MMHG | HEART RATE: 75 BPM | BODY MASS INDEX: 31 KG/M2 | HEIGHT: 71 IN

## 2020-01-10 DIAGNOSIS — E55.9 VITAMIN D DEFICIENCY: ICD-10-CM

## 2020-01-10 DIAGNOSIS — R93.1 AGATSTON CORONARY ARTERY CALCIUM SCORE BETWEEN 100 AND 199: ICD-10-CM

## 2020-01-10 DIAGNOSIS — E80.4 GILBERT SYNDROME: ICD-10-CM

## 2020-01-10 DIAGNOSIS — E78.01 FAMILIAL HYPERCHOLESTEROLEMIA: ICD-10-CM

## 2020-01-10 PROCEDURE — 99212 OFFICE O/P EST SF 10 MIN: CPT | Performed by: FAMILY MEDICINE

## 2020-01-10 PROCEDURE — 99214 OFFICE O/P EST MOD 30 MIN: CPT | Performed by: FAMILY MEDICINE

## 2020-01-10 ASSESSMENT — ENCOUNTER SYMPTOMS
VOMITING: 0
NERVOUS/ANXIOUS: 0
ABDOMINAL PAIN: 0
FEVER: 0
CHILLS: 0
DEPRESSION: 0
SHORTNESS OF BREATH: 0
PALPITATIONS: 0
TREMORS: 0
BLURRED VISION: 0
FOCAL WEAKNESS: 0
MYALGIAS: 0
WEAKNESS: 0
BLOOD IN STOOL: 0
INSOMNIA: 0
COUGH: 0
SEIZURES: 0
DIZZINESS: 0
DIARRHEA: 0
HEADACHES: 0
NAUSEA: 0
HEMOPTYSIS: 0
SORE THROAT: 0
WHEEZING: 0
DOUBLE VISION: 0
BRUISES/BLEEDS EASILY: 0
ORTHOPNEA: 0

## 2020-01-10 NOTE — PROGRESS NOTES
FOLLOW-UP VASCULAR MED VISIT  Subjective:   Jacky Paz is a 39 y.o. male who presents today 1/10/20 for   Chief Complaint   Patient presents with   • Follow-Up     Familial hypercholesterolemia   Initially referred by PCP (LEONARDO Anderson) for evaluation and mgmt of HLD    HPI:      Hyperlipidemia:    Main concern is weight gain and consistent problems with maintaining healthy weight.  Has reduced exercise last month, but prior running 3-4x/week w/o much weight loss. Reports reduction of soda recently.  Concern for hypothyroidism.   Had stopped cholest-off last visit and has restarted.   Current treatment: High intensity statin   rosuva 40mg, zetia 10mg, restarted plant sterols   Myalgias? No  Other adverse drug reactions? No  Lipid profile: as noted   Known FH with family hx of FH and CAD.    Baseline cholesterol off tx TC = 399 when age 11. Untreated has been 270s.    BP:   no current HTN dx or HTN-associated sx     Antiplatelet/anticoagulation: No     Pertinent HTN hx:   Age at HTN dx:  No dx   Past HTN medications: none  HTN crises:  No prior hospitalization or crises   ASCVD risk factors:     DM: No   CKD:  No  Lifestyle factors:     High salt: No   EtOH: No  Interfering substances:     NSAIDs: No    Decongestants. No    ASCVD calculator (contraindicated if ASCVD+, [id number])   10yr-risk calc: n/a , too young    Lifetime-risk calc: n/a    Clinical evidence of ASCVD:     1) hx of MI/ACS:  No   2) coronary or other revascularization procedure: No   3) TIA/ischemic CVA: No   4) PAD (including ODELL <0.9): No   5) documented (CAD, Renal athero, AA due to athero, carotid plaque >50% stenosis: No    Major RFs:     1) Age (Men>44, women>54):  no   2) Fhx early CAD (<55 men, <65 women):  yes   3) cigarette smoking:  No   4) high BP >139/89 or on tx: no   5) Low HDL-C (<40 men, <50 women):  No    Family History   Problem Relation Age of Onset   • Hyperlipidemia Mother         FH, OM=473 untreated   • Heart Disease  Mother         4vCABG in 40s, heart valve replacement   • Hypertension Mother    • Other Mother         carotid artery disease, bilat   • Hyperlipidemia Father    • Diabetes Father    • Hypertension Father    • Heart Attack Father 70   • Abdominal aortic aneurysm Father    • Hyperlipidemia Sister         FH, untreated [id number]   • Heart Attack Maternal Grandfather         60s,    • Hyperlipidemia Maternal Grandfather    • Heart Attack Paternal Grandfather          84   • Hyperlipidemia Sister         FH,  due to biking accident    • Heart Disease Cousin         maternal side, 5vCABG in 30s    • Hyperlipidemia Son         AQ=082, dx age 11       Social History     Tobacco Use   Smoking Status Never Smoker   Smokeless Tobacco Never Used     Social History     Tobacco Use   • Smoking status: Never Smoker   • Smokeless tobacco: Never Used   Substance Use Topics   • Alcohol use: No   • Drug use: No     Outpatient Encounter Medications as of 1/10/2020   Medication Sig Dispense Refill   • Plant Sterols and Stanols (CHOLESTOFF PO) Take  by mouth.     • rosuvastatin (CRESTOR) 40 MG tablet Take 1 Tab by mouth every day for 360 days. 90 Tab 3   • ezetimibe (ZETIA) 10 MG Tab Take 1 Tab by mouth every day for 360 days. 90 Tab 3     No facility-administered encounter medications on file as of 1/10/2020.      No Known Allergies  DIET AND EXERCISE:  Weight Change: no changes   BMI Readings from Last 5 Encounters:   01/10/20 30.88 kg/m²   19 30.15 kg/m²   19 28.45 kg/m²   10/11/18 28.45 kg/m²   18 28.73 kg/m²      Diet: low fat  Exercise: moderate regular exercise program, 3mi 3x/week      Review of Systems   Constitutional: Negative for chills, fever and malaise/fatigue.   HENT: Negative for nosebleeds, sore throat and tinnitus.    Eyes: Negative for blurred vision and double vision.   Respiratory: Negative for cough, hemoptysis, shortness of breath and wheezing.    Cardiovascular: Negative for chest  "pain, palpitations, orthopnea and leg swelling.   Gastrointestinal: Negative for abdominal pain, blood in stool, diarrhea, melena, nausea and vomiting.   Genitourinary: Negative for hematuria.   Musculoskeletal: Negative for joint pain and myalgias.   Skin: Negative for itching and rash.   Neurological: Negative for dizziness, tremors, focal weakness, seizures, weakness and headaches.   Endo/Heme/Allergies: Does not bruise/bleed easily.   Psychiatric/Behavioral: Negative for depression. The patient is not nervous/anxious and does not have insomnia.       Objective:     Vitals:    01/10/20 0954   BP: 125/62   BP Location: Left arm   Patient Position: Sitting   BP Cuff Size: Adult   Pulse: 75   Weight: 100.4 kg (221 lb 6.4 oz)   Height: 1.803 m (5' 11\")      BP Readings from Last 4 Encounters:   01/10/20 125/62   06/14/19 118/58   04/19/19 122/63   10/11/18 136/63     Body mass index is 30.88 kg/m².     Physical Exam   Constitutional: He is oriented to person, place, and time. He appears well-developed and well-nourished. He is cooperative. No distress.   HENT:   Head: Normocephalic and atraumatic.   Mouth/Throat: Oropharynx is clear and moist and mucous membranes are normal.   Eyes: Pupils are equal, round, and reactive to light. Conjunctivae are normal.   Neck: Trachea normal and normal range of motion. Neck supple. Normal carotid pulses and no JVD present. Carotid bruit is not present. No thyromegaly present.   Cardiovascular: Normal rate, regular rhythm, normal heart sounds and intact distal pulses. Exam reveals no gallop and no friction rub.   No murmur heard.  Pulses:       Carotid pulses are 2+ on the right side and 2+ on the left side.       Radial pulses are 2+ on the right side and 2+ on the left side.        Dorsalis pedis pulses are 2+ on the right side and 2+ on the left side.        Posterior tibial pulses are 2+ on the right side and 2+ on the left side.   Edema:    RLE: none     LLE: none   Spider " telangectasia:       RLE:  None      LLE: none   Varicosities:         RLE: none      LLE: none   Corona phlebectatica:      RLE:  None        LLE:  None   Cording:         RLE:  None     LLE: None    Pulmonary/Chest: Effort normal and breath sounds normal. No respiratory distress.   Abdominal: Soft. Bowel sounds are normal. He exhibits no distension and no mass. There is no hepatosplenomegaly. There is no tenderness. There is no rebound and no guarding.   Musculoskeletal:         General: No edema.   Lymphadenopathy:     He has no cervical adenopathy.   Neurological: He is alert and oriented to person, place, and time. No cranial nerve deficit. Coordination and gait normal.   Skin: Skin is warm and dry. He is not diaphoretic. No cyanosis. No pallor. Nails show no clubbing.   Psychiatric: He has a normal mood and affect.     Lab Results   Component Value Date    CHOLSTRLTOT 215 (H) 01/03/2020     (H) 01/03/2020    HDL 46 01/03/2020    TRIGLYCERIDE 185 (H) 01/03/2020    LDLPART 20.7 06/07/2019    LDLPART 971 06/07/2019    SMLLDL 522 06/07/2019    LHDLPART 5.8 06/07/2019    LVLDLPT 2.1 06/07/2019    LDLCHOL 71 06/07/2019    HDLSIZE 9.1 06/07/2019    VLDLSIZE 48.4 (H) 06/07/2019    HDLPART 30.7 (L) 06/07/2019             Lab Results   Component Value Date    SODIUM 139 01/03/2020    POTASSIUM 4.0 01/03/2020    CHLORIDE 104 01/03/2020    CO2 26 01/03/2020    GLUCOSE 84 01/03/2020    BUN 17 01/03/2020    CREATININE 1.00 01/03/2020    IF[place] >60 01/03/2020    IFNOTAFR >60 01/03/2020            Results for LIBRA LAUREEN MENDEZ (MRN [id number]) as of 1/10/2020 10:05   Ref. Range 6/7/2019 07:09   Cholesterol,Tot Latest Ref Range: <=199 mg/dL 133   Triglycerides Latest Ref Range: 30 - 149 mg/dL 74   HDL Latest Ref Range: 40 - 59 mg/dL 47   EER LipoFit by NMR Unknown See Note   LDL Cholesterol Latest Ref Range: <=129 mg/dL 71   HDL Particle No. Latest Ref Range: >=33.0 umol/L 30.7 (L)   Small LDL Latest Ref Range: <=634  nmol/L 522   L-HDL Particle No. Latest Ref Range: >=4.2 umol/L 5.8   VLDL Size Latest Ref Range: <=46.7 nm 48.4 (H)   L-VLDL Particle No. Latest Ref Range: <=2.7 nmol/L 2.1   HDL Size Latest Ref Range: >=8.9 nm 9.1   LDL Particle Size Latest Ref Range: >=20.7 nm 20.7   LDL Particle Latest Ref Range: <=1135 nmol/L 971   Apolipoprotein-B Latest Ref Range: 55 - 140 mg/dL 68     VASCULAR IMAGING:     CT CAC scoring 5/18/18  Calcium score:  155.8         Medical Decision Making:  Today's Assessment / Status / Plan:     1. Familial hypercholesterolemia  Comp Metabolic Panel    Lipid Profile    APOLIPOPROTEIN B   2. Agatston coronary artery calcium score between 100 and 199     3. Gilbert syndrome  Comp Metabolic Panel   4. Vitamin D deficiency       Patient Type: Secondary Prevention    Etiology of Established CVD if Present:    1) Moderate atherosclerotic plaque burden by CT CAC scoring with score = 155    Lipid Management: Qualifies for Statin Therapy Based on 2013 ACC/AHA Guidelines: yes  Calculated 10-Year Risk of ASCVD: N/A  Currently on Statin: Yes  hsCRP low risk = 0.5  CAC score = 155   8/2018: LDLP 1286, small LDLP 648 - elevated   9/2019: LDLP 971, small LDLP 522  Concordant LDLP/LDLC ratio = 12 - track lipid and/or apoB moving forward  NLA Risk Category:   Very high:  Hx of FHA  Tx threshold:  non-HDL-C >99, LDL-C >69  Tx goal: non-HDL-C <100,  LDL-C <70  (optional: apoB <80)  Target LDL-P <1000  At goal:  Yes for LDLP, LDLC and apoB met   Tx plan:   - continue crestor 40mg  - continue zetia 10mg daily   - may qualify for PCSK9 inhibitor based upon LDL targes and Familial hyperlipidemia  - update labs in 3mo with standard lipid and apoB only     Blood Pressure Management:Goal: ACC/AHA (2017) goal <130/80  Home BP at goal:  yes  Office BP at goal:  yes  Plan:   - continue home BP monitoring, reviewed correct technique:  Yes   - order 24h ABPM:  NO  - continue healthy lifestyle     Glycemic Status: Normal  -  continue healthy lifestyle, monitor labs annually     Anti-Platelet/Anti-Coagulant Tx: N\A  - no indications based upon current evidence and age     Smoking: continued complete avoidance of all tobacco products     Physical Activity: continue healthy activity to improve CV fitness, see care instructions for additional details   - increase to >150min/week     Weight Management and Nutrition: Dietary plan was discussed with patient at this visit including DASH, low sodium and/or as outlined in care instructions   - reinforced weight loss strategies     Other:    1) subclinical moderate CAD based upon CAC scoring.  No indications of active disease. After further review of currently available literature and evidence/guidelines, pt has no indication for further cardiac testing at this time as no interventions would be appropriate based upon his risk level, lack of high grade 3 vessel or LMA disease and age.  Available evidence would indicate optimal medical therapy appropriate for his age and established, stable disease.  - continue med mgmt   - CAC score at intermediate risk level and overall risk factors are stable    2) bradycardia - resolved, low 40s in the past.       3) BMI = 30  - focus on adjusting exercise and diet every 4-6 weeks     4) Gilbert's - stable total bilirubin levels   - monitor over time since on statin, defer primary mgmt to PCP      Instructed to follow-up with PCP for remainder of adult medical needs: yes  We will partner with other providers in the management of established vascular disease and cardiometabolic risk factors.    Studies to Be Obtained:  None   Labs to Be Obtained:  Cmp. Lipid, apoB - 3 months - will call results     Follow up in:   6mo with me     Total 25 minutes face-to-face time spent with patient, with greater than 50% of the total time counseling on weight reduction, TLC, med mgmt, future tx options coming available and f/u planning        Shahbaz Dominguez M.D.

## 2020-01-11 NOTE — PATIENT INSTRUCTIONS
Continue meds   - no indications for additional testing   - repeat cardiac score in 2023  - update labs in 3mo  - continue weight loss     Blood pressure and BP-lowering diet:  If you are being treated for blood pressure today: please be advised that stabilizing BP may require multiple med changes and close monitoring over the next several months and initially there may be additional imaging and labs and the possibility of adverse drug reactions. Variability of your blood pressure and heart rate may occur until we have things stabilized.  Please feel free to contact our office as needed for questions or concerns.      SODIUM RESTRICTIONS: - consume no more than 2,300mg of sodium daily (look at food labels) ,or if you have high BP then reduce to no more than 1,500mg of sodium daily   For more information visit: https://www.[employer]/contents/low-sodium-diet-the-basics/print?ntlmwErv=9914&source=see_link     DASH diet   (https://www.heart.org/en/health-topics/high-blood-pressure/changes-you-can-make-to-manage-high-blood-pressure/managing-blood-pressure-with-a-heart-healthy-diet)    - if you notice BP > 140/>90 for more than 3 days, then contact our office for further instructions    Cholesterol-reducing diets:   - AHA diet  (http://www.heart.org/en/healthy-living/healthy-eating/eat-smart/nutrition-basics/aha-diet-and-lifestyle-recommendations)   - Mediterranean diet (http://www.heart.org/en/healthy-living/healthy-eating/eat-smart/nutrition-basics/mediterranean-diet)   - lowering triglycerides: (http://my.americanheart.org/idc/groups/ahamah-public/@wcm/@sop/@[facility]/documents/downloadable/[facility]_425988.pdf)     Physical activity: Unless directed otherwise at today's visit or you are physically incapable due to your current health or medical conditions, it is advised per the American Heart Association to engage in moderate to vigorous physical activity such as brisk walking, swimming, cycling, >150 minutes per week at  30-40 minutes per session, 3 to 5 times weekly.      General healthly nutrition advice:  - the USDA food pattern (https://www.cnpp.usda.gov/USDAFoodPatterns)  - plate method (https://www.choosemyplate.gov/)  - consume diet that emphasizes intake of vegetables, fruits, and whole grains,  - use low fat diary products, poultry, fish, legumes, nontropical vegetable oils, nuts  - limit intake of sweets, sugar-sweetned beverages, and red meats   - reduce saturated and trans fats to <6% of your daily calories

## 2020-06-19 ENCOUNTER — HOSPITAL ENCOUNTER (OUTPATIENT)
Dept: LAB | Facility: MEDICAL CENTER | Age: 40
End: 2020-06-19
Attending: FAMILY MEDICINE

## 2020-06-19 DIAGNOSIS — E80.4 GILBERT SYNDROME: ICD-10-CM

## 2020-06-19 DIAGNOSIS — E78.01 FAMILIAL HYPERCHOLESTEROLEMIA: ICD-10-CM

## 2020-06-19 LAB
ALBUMIN SERPL BCP-MCNC: 4.6 G/DL (ref 3.2–4.9)
ALBUMIN/GLOB SERPL: 1.5 G/DL
ALP SERPL-CCNC: 77 U/L (ref 30–99)
ALT SERPL-CCNC: 52 U/L (ref 2–50)
ANION GAP SERPL CALC-SCNC: 10 MMOL/L (ref 7–16)
AST SERPL-CCNC: 39 U/L (ref 12–45)
BILIRUB SERPL-MCNC: 2.5 MG/DL (ref 0.1–1.5)
BUN SERPL-MCNC: 17 MG/DL (ref 8–22)
CALCIUM SERPL-MCNC: 9.8 MG/DL (ref 8.5–10.5)
CHLORIDE SERPL-SCNC: 98 MMOL/L (ref 96–112)
CHOLEST SERPL-MCNC: 168 MG/DL (ref 100–199)
CO2 SERPL-SCNC: 25 MMOL/L (ref 20–33)
CREAT SERPL-MCNC: 0.97 MG/DL (ref 0.5–1.4)
FASTING STATUS PATIENT QL REPORTED: NORMAL
GLOBULIN SER CALC-MCNC: 3.1 G/DL (ref 1.9–3.5)
GLUCOSE SERPL-MCNC: 79 MG/DL (ref 65–99)
HDLC SERPL-MCNC: 52 MG/DL
LDLC SERPL CALC-MCNC: 94 MG/DL
POTASSIUM SERPL-SCNC: 4.1 MMOL/L (ref 3.6–5.5)
PROT SERPL-MCNC: 7.7 G/DL (ref 6–8.2)
SODIUM SERPL-SCNC: 133 MMOL/L (ref 135–145)
TRIGL SERPL-MCNC: 111 MG/DL (ref 0–149)

## 2020-06-19 PROCEDURE — 36415 COLL VENOUS BLD VENIPUNCTURE: CPT

## 2020-06-19 PROCEDURE — 82172 ASSAY OF APOLIPOPROTEIN: CPT

## 2020-06-19 PROCEDURE — 80061 LIPID PANEL: CPT

## 2020-06-19 PROCEDURE — 80053 COMPREHEN METABOLIC PANEL: CPT

## 2020-06-22 LAB — APO B100 SERPL-MCNC: 80 MG/DL (ref 55–140)

## 2020-07-31 ENCOUNTER — APPOINTMENT (OUTPATIENT)
Dept: VASCULAR LAB | Facility: MEDICAL CENTER | Age: 40
End: 2020-07-31

## 2020-08-21 ENCOUNTER — OFFICE VISIT (OUTPATIENT)
Dept: VASCULAR LAB | Facility: MEDICAL CENTER | Age: 40
End: 2020-08-21
Attending: INTERNAL MEDICINE

## 2020-08-21 VITALS
BODY MASS INDEX: 30.55 KG/M2 | WEIGHT: 218.2 LBS | HEIGHT: 71 IN | SYSTOLIC BLOOD PRESSURE: 126 MMHG | DIASTOLIC BLOOD PRESSURE: 64 MMHG | HEART RATE: 47 BPM

## 2020-08-21 DIAGNOSIS — E55.9 VITAMIN D DEFICIENCY: ICD-10-CM

## 2020-08-21 DIAGNOSIS — E78.01 FAMILIAL HYPERCHOLESTEROLEMIA: ICD-10-CM

## 2020-08-21 DIAGNOSIS — E80.4 GILBERT SYNDROME: ICD-10-CM

## 2020-08-21 DIAGNOSIS — R00.1 BRADYCARDIA: ICD-10-CM

## 2020-08-21 DIAGNOSIS — R93.1 AGATSTON CORONARY ARTERY CALCIUM SCORE BETWEEN 100 AND 199: ICD-10-CM

## 2020-08-21 PROCEDURE — 99212 OFFICE O/P EST SF 10 MIN: CPT | Performed by: FAMILY MEDICINE

## 2020-08-21 PROCEDURE — 99214 OFFICE O/P EST MOD 30 MIN: CPT | Performed by: FAMILY MEDICINE

## 2020-08-21 RX ORDER — EZETIMIBE 10 MG/1
10 TABLET ORAL DAILY
COMMUNITY
End: 2021-08-05 | Stop reason: SDUPTHER

## 2020-08-21 ASSESSMENT — ENCOUNTER SYMPTOMS
WEAKNESS: 0
HEADACHES: 0
TREMORS: 0
HEMOPTYSIS: 0
WHEEZING: 0
NAUSEA: 0
ORTHOPNEA: 0
BLOOD IN STOOL: 0
VOMITING: 0
DIZZINESS: 0
CHILLS: 0
DEPRESSION: 0
NERVOUS/ANXIOUS: 0
SORE THROAT: 0
ABDOMINAL PAIN: 0
BLURRED VISION: 0
SHORTNESS OF BREATH: 0
DIARRHEA: 0
BRUISES/BLEEDS EASILY: 0
INSOMNIA: 0
DOUBLE VISION: 0
FOCAL WEAKNESS: 0
PALPITATIONS: 0
MYALGIAS: 0
COUGH: 0
FEVER: 0
SEIZURES: 0

## 2020-08-21 NOTE — PROGRESS NOTES
FOLLOW-UP VASCULAR MED VISIT  Subjective:   Jacky Paz is a 40 y.o. male who presents today 1/10/20 for   Chief Complaint   Patient presents with   • Follow-Up     Familial hypercholesterolemia, labs    Initially referred by PCP (LEONARDO Anderson) for evaluation and mgmt of HLD    HPI:      FH:   No interval concerns    Current treatment: High intensity statin   rosuva 40mg, zetia 10mg, plant sterols   Myalgias? No  Other adverse drug reactions? No  Lipid profile: as noted     Known FH with family hx of FH and CAD.    Baseline cholesterol off tx TC = 399 when age 11. Untreated has been 270s.    BP:   no current HTN dx or HTN-associated sx     Antiplatelet/anticoagulation: No     Clinical evidence of ASCVD:  None     Major RFs:     1) Age (Men>44, women>54):  no   2) Fhx early CAD (<55 men, <65 women):  yes   3) cigarette smoking:  No   4) high BP >139/89 or on tx: no   5) Low HDL-C (<40 men, <50 women):  No    Family History   Problem Relation Age of Onset   • Hyperlipidemia Mother         FH, OC=491 untreated   • Heart Disease Mother         4vCABG in 40s, heart valve replacement   • Hypertension Mother    • Other Mother         carotid artery disease, bilat   • Hyperlipidemia Father    • Diabetes Father    • Hypertension Father    • Heart Attack Father 70   • Abdominal aortic aneurysm Father    • Hyperlipidemia Sister         FH, untreated ES=882   • Heart Attack Maternal Grandfather         60s,    • Hyperlipidemia Maternal Grandfather    • Heart Attack Paternal Grandfather          84   • Hyperlipidemia Sister         FH,  due to biking accident    • Heart Disease Cousin         maternal side, 5vCABG in 30s    • Hyperlipidemia Son         OD=209, dx age 11       Outpatient Encounter Medications as of 2020   Medication Sig Dispense Refill   • ezetimibe (ZETIA) 10 MG Tab Take 10 mg by mouth every day.     • Cholecalciferol (VITAMIN D-3) 125 MCG (5000 UT) Tab Take  by mouth. 30 Tab    • Plant  "Sterols and Stanols (CHOLESTOFF PO) Take  by mouth.     • rosuvastatin (CRESTOR) 40 MG tablet Take 1 Tab by mouth every day for 360 days. 90 Tab 3     No facility-administered encounter medications on file as of 8/21/2020.      No Known Allergies     Social History     Tobacco Use   • Smoking status: Never Smoker   • Smokeless tobacco: Never Used   Substance Use Topics   • Alcohol use: No   • Drug use: No      DIET AND EXERCISE:  Weight Change: no changes   BMI Readings from Last 5 Encounters:   08/21/20 30.43 kg/m²   01/10/20 30.88 kg/m²   06/14/19 30.15 kg/m²   04/19/19 28.45 kg/m²   10/11/18 28.45 kg/m²      Diet: low fat  Exercise: moderate regular exercise program, 3mi 3x/week      Review of Systems   Constitutional: Negative for chills, fever and malaise/fatigue.   HENT: Negative for nosebleeds, sore throat and tinnitus.    Eyes: Negative for blurred vision and double vision.   Respiratory: Negative for cough, hemoptysis, shortness of breath and wheezing.    Cardiovascular: Negative for chest pain, palpitations, orthopnea and leg swelling.   Gastrointestinal: Negative for abdominal pain, blood in stool, diarrhea, melena, nausea and vomiting.   Genitourinary: Negative for hematuria.   Musculoskeletal: Negative for joint pain and myalgias.   Skin: Negative for itching and rash.   Neurological: Negative for dizziness, tremors, focal weakness, seizures, weakness and headaches.   Endo/Heme/Allergies: Does not bruise/bleed easily.   Psychiatric/Behavioral: Negative for depression. The patient is not nervous/anxious and does not have insomnia.       Objective:     Vitals:    08/21/20 1122   BP: 126/64   BP Location: Left arm   Patient Position: Sitting   BP Cuff Size: Adult   Pulse: (!) 47   Weight: 99 kg (218 lb 3.2 oz)   Height: 1.803 m (5' 11\")      BP Readings from Last 4 Encounters:   08/21/20 126/64   01/10/20 125/62   06/14/19 118/58   04/19/19 122/63     Body mass index is 30.43 kg/m².     Physical Exam "   Constitutional: He is oriented to person, place, and time. He appears well-developed and well-nourished. He is cooperative. No distress.   HENT:   Head: Normocephalic and atraumatic.   Mouth/Throat: Oropharynx is clear and moist and mucous membranes are normal.   Eyes: Pupils are equal, round, and reactive to light. Conjunctivae are normal.   Neck: Trachea normal and normal range of motion. Neck supple. Normal carotid pulses and no JVD present. Carotid bruit is not present. No thyromegaly present.   Cardiovascular: Normal rate, regular rhythm, normal heart sounds and intact distal pulses. Exam reveals no gallop and no friction rub.   No murmur heard.  Pulses:       Carotid pulses are 2+ on the right side and 2+ on the left side.       Radial pulses are 2+ on the right side and 2+ on the left side.        Dorsalis pedis pulses are 2+ on the right side and 2+ on the left side.        Posterior tibial pulses are 2+ on the right side and 2+ on the left side.   Edema:    RLE: none     LLE: none   Spider telangectasia:       RLE:  None      LLE: none   Varicosities:         RLE: none      LLE: none   Corona phlebectatica:      RLE:  None        LLE:  None   Cording:         RLE:  None     LLE: None    Pulmonary/Chest: Effort normal and breath sounds normal. No respiratory distress.   Abdominal: Soft. Bowel sounds are normal. He exhibits no distension and no mass. There is no hepatosplenomegaly. There is no abdominal tenderness. There is no rebound and no guarding.   Musculoskeletal:         General: No edema.   Lymphadenopathy:     He has no cervical adenopathy.   Neurological: He is alert and oriented to person, place, and time. No cranial nerve deficit. Coordination and gait normal.   Skin: Skin is warm and dry. He is not diaphoretic. No cyanosis. No pallor. Nails show no clubbing.   Psychiatric: He has a normal mood and affect.     Lab Results   Component Value Date    CHOLSTRLTOT 168 06/19/2020    LDL 94 06/19/2020     HDL 52 06/19/2020    TRIGLYCERIDE 111 06/19/2020    LDLPART 20.7 06/07/2019    LDLPART 971 06/07/2019    SMLLDL 522 06/07/2019    LHDLPART 5.8 06/07/2019    LVLDLPT 2.1 06/07/2019    LDLCHOL 71 06/07/2019    HDLSIZE 9.1 06/07/2019    VLDLSIZE 48.4 (H) 06/07/2019    HDLPART 30.7 (L) 06/07/2019             Lab Results   Component Value Date    SODIUM 133 (L) 06/19/2020    POTASSIUM 4.1 06/19/2020    CHLORIDE 98 06/19/2020    CO2 25 06/19/2020    GLUCOSE 79 06/19/2020    BUN 17 06/19/2020    CREATININE 0.97 06/19/2020    IFAFRICA >60 06/19/2020    IFNOTAFR >60 06/19/2020             Ref. Range 6/7/2019 07:09   Cholesterol,Tot Latest Ref Range: <=199 mg/dL 133   Triglycerides Latest Ref Range: 30 - 149 mg/dL 74   HDL Latest Ref Range: 40 - 59 mg/dL 47   EER LipoFit by NMR Unknown See Note   LDL Cholesterol Latest Ref Range: <=129 mg/dL 71   HDL Particle No. Latest Ref Range: >=33.0 umol/L 30.7 (L)   Small LDL Latest Ref Range: <=634 nmol/L 522   L-HDL Particle No. Latest Ref Range: >=4.2 umol/L 5.8   VLDL Size Latest Ref Range: <=46.7 nm 48.4 (H)   L-VLDL Particle No. Latest Ref Range: <=2.7 nmol/L 2.1   HDL Size Latest Ref Range: >=8.9 nm 9.1   LDL Particle Size Latest Ref Range: >=20.7 nm 20.7   LDL Particle Latest Ref Range: <=1135 nmol/L 971   Apolipoprotein-B Latest Ref Range: 55 - 140 mg/dL 68     VASCULAR IMAGING:     CT CAC scoring 5/18/18  Calcium score:  155.8         Medical Decision Making:  Today's Assessment / Status / Plan:     1. Familial hypercholesterolemia  Comp Metabolic Panel    Lipid Profile    APOLIPOPROTEIN B   2. Agatston coronary artery calcium score between 100 and 199     3. Gilbert syndrome     4. Vitamin D deficiency     5. Bradycardia       Patient Type: Secondary Prevention    Etiology of Established CVD if Present:    1) Moderate atherosclerotic plaque burden by CT CAC scoring with score = 155    Lipid Management: Qualifies for Statin Therapy Based on 2013 ACC/AHA Guidelines:  yes  Calculated 10-Year Risk of ASCVD: N/A  Currently on Statin: Yes  hsCRP low risk = 0.5  CAC score = 155   8/2018: LDLP 1286, small LDLP 648 - elevated   9/2019: LDLP 971, small LDLP 522  Concordant LDLP/LDLC ratio = 12 - track lipid and/or apoB moving forward   NLA Risk Category: high:  LDL >189   Tx goal: non-HDL-C <100,  LDL-C <70  (optional: apoB <80)   Target LDL-P <1000   At goal:  Yes for LDLP, LDLC and apoB   Tx plan:   - continue crestor 40mg  - continue zetia 10mg daily   - may qualify for nexletol vs PCSK9 inhibitor due to FH   - update labs in Dec, if stable, then annually     Blood Pressure Management:Goal: ACC/AHA (2017) goal <130/80   Home BP at goal:  yes   Office BP at goal:  yes  Plan:   - continue home BP monitoring, reviewed correct technique:  Yes   - order 24h ABPM:  NO  - continue healthy lifestyle     Glycemic Status: Normal  - continue healthy lifestyle, monitor labs annually     Anti-Platelet/Anti-Coagulant Tx: N\A  - no indications based upon current evidence and age     Smoking: continued complete avoidance of all tobacco products     Physical Activity: continue healthy activity to improve CV fitness, see care instructions for additional details   - increase to >150min/week     Weight Management and Nutrition: Dietary plan was discussed with patient at this visit including DASH, low sodium and/or as outlined in care instructions   - reinforced weight loss strategies     Other:    1) subclinical moderate CAD based upon CAC scoring.  No symptoms   No indications of active disease.   After further review of currently available literature and evidence/guidelines, pt has no indication for further cardiac testing at this time as no interventions would be appropriate based upon his risk level, lack of high grade 3 vessel or LMA disease and age.  Available evidence would indicate optimal medical therapy appropriate for his age and established, stable disease.    - continue med mgmt   - CAC  score at intermediate risk level and overall risk factors are stable    2) bradycardia - upper 40s, stable, no sx    3) BMI = 30  - focus on adjusting exercise and diet every 4-6 weeks     4) Gilbert's - stable total bilirubin levels in 2.0's range, mild high ALT only   May have component of NAFLD due to BMI 30  - monitor over time since on statin,   - defer primary mgmt to PCP    5) vit D deficiency   - start Vit D3 5,000 units daily     Instructed to follow-up with PCP for remainder of adult medical needs: yes  We will partner with other providers in the management of established vascular disease and cardiometabolic risk factors.    Studies to Be Obtained:  Repeat CAC scoring in 2023   Labs to Be Obtained:  As noted     Follow up in: Dec with me telehealth, then annually if stable     Total 25 minutes face-to-face time spent with patient, with greater than 50% of the total time counseling on weight reduction, TLC, med mgmt, future tx options coming available and f/u planning        Shahbaz Dominguez M.D.

## 2020-10-12 ENCOUNTER — TELEPHONE (OUTPATIENT)
Dept: VASCULAR LAB | Facility: MEDICAL CENTER | Age: 40
End: 2020-10-12

## 2020-10-12 DIAGNOSIS — E78.01 FAMILIAL HYPERCHOLESTEROLEMIA: ICD-10-CM

## 2020-10-12 RX ORDER — ROSUVASTATIN CALCIUM 40 MG/1
40 TABLET, COATED ORAL DAILY
Qty: 30 TAB | Refills: 3 | Status: SHIPPED | OUTPATIENT
Start: 2020-10-12 | End: 2020-10-12 | Stop reason: SDUPTHER

## 2020-10-12 RX ORDER — ROSUVASTATIN CALCIUM 40 MG/1
40 TABLET, COATED ORAL DAILY
Qty: 90 TAB | Refills: 1 | Status: SHIPPED | OUTPATIENT
Start: 2020-10-12 | End: 2021-04-26

## 2020-11-28 ENCOUNTER — HOSPITAL ENCOUNTER (OUTPATIENT)
Dept: LAB | Facility: MEDICAL CENTER | Age: 40
End: 2020-11-28
Attending: DENTIST

## 2020-11-28 LAB
COVID ORDER STATUS COVID19: NORMAL
SARS-COV-2 AB SERPL QL IA: NORMAL

## 2020-11-28 PROCEDURE — U0003 INFECTIOUS AGENT DETECTION BY NUCLEIC ACID (DNA OR RNA); SEVERE ACUTE RESPIRATORY SYNDROME CORONAVIRUS 2 (SARS-COV-2) (CORONAVIRUS DISEASE [COVID-19]), AMPLIFIED PROBE TECHNIQUE, MAKING USE OF HIGH THROUGHPUT TECHNOLOGIES AS DESCRIBED BY CMS-2020-01-R: HCPCS

## 2020-11-28 PROCEDURE — 86769 SARS-COV-2 COVID-19 ANTIBODY: CPT

## 2020-11-28 PROCEDURE — C9803 HOPD COVID-19 SPEC COLLECT: HCPCS

## 2020-11-29 LAB
SARS-COV-2 RNA RESP QL NAA+PROBE: DETECTED
SPECIMEN SOURCE: ABNORMAL

## 2020-12-16 ENCOUNTER — HOSPITAL ENCOUNTER (OUTPATIENT)
Dept: LAB | Facility: MEDICAL CENTER | Age: 40
End: 2020-12-16
Attending: FAMILY MEDICINE

## 2020-12-16 DIAGNOSIS — E78.01 FAMILIAL HYPERCHOLESTEROLEMIA: ICD-10-CM

## 2020-12-16 LAB
ALBUMIN SERPL BCP-MCNC: 4.6 G/DL (ref 3.2–4.9)
ALBUMIN/GLOB SERPL: 1.8 G/DL
ALP SERPL-CCNC: 72 U/L (ref 30–99)
ALT SERPL-CCNC: 52 U/L (ref 2–50)
ANION GAP SERPL CALC-SCNC: 9 MMOL/L (ref 7–16)
AST SERPL-CCNC: 33 U/L (ref 12–45)
BILIRUB SERPL-MCNC: 2 MG/DL (ref 0.1–1.5)
BUN SERPL-MCNC: 13 MG/DL (ref 8–22)
CALCIUM SERPL-MCNC: 9.7 MG/DL (ref 8.5–10.5)
CHLORIDE SERPL-SCNC: 106 MMOL/L (ref 96–112)
CHOLEST SERPL-MCNC: 179 MG/DL (ref 100–199)
CO2 SERPL-SCNC: 25 MMOL/L (ref 20–33)
CREAT SERPL-MCNC: 0.91 MG/DL (ref 0.5–1.4)
FASTING STATUS PATIENT QL REPORTED: NORMAL
GLOBULIN SER CALC-MCNC: 2.5 G/DL (ref 1.9–3.5)
GLUCOSE SERPL-MCNC: 91 MG/DL (ref 65–99)
HDLC SERPL-MCNC: 50 MG/DL
LDLC SERPL CALC-MCNC: 102 MG/DL
POTASSIUM SERPL-SCNC: 3.8 MMOL/L (ref 3.6–5.5)
PROT SERPL-MCNC: 7.1 G/DL (ref 6–8.2)
SODIUM SERPL-SCNC: 140 MMOL/L (ref 135–145)
TRIGL SERPL-MCNC: 135 MG/DL (ref 0–149)

## 2020-12-16 PROCEDURE — 80061 LIPID PANEL: CPT

## 2020-12-16 PROCEDURE — 82172 ASSAY OF APOLIPOPROTEIN: CPT

## 2020-12-16 PROCEDURE — 36415 COLL VENOUS BLD VENIPUNCTURE: CPT

## 2020-12-16 PROCEDURE — 80053 COMPREHEN METABOLIC PANEL: CPT

## 2020-12-18 ENCOUNTER — OFFICE VISIT (OUTPATIENT)
Dept: VASCULAR LAB | Facility: MEDICAL CENTER | Age: 40
End: 2020-12-18
Attending: FAMILY MEDICINE

## 2020-12-18 DIAGNOSIS — E55.9 VITAMIN D DEFICIENCY: ICD-10-CM

## 2020-12-18 DIAGNOSIS — R00.1 BRADYCARDIA: ICD-10-CM

## 2020-12-18 DIAGNOSIS — E78.01 FAMILIAL HYPERCHOLESTEROLEMIA: ICD-10-CM

## 2020-12-18 DIAGNOSIS — E80.4 GILBERT SYNDROME: ICD-10-CM

## 2020-12-18 DIAGNOSIS — E78.41 ELEVATED LIPOPROTEIN(A): ICD-10-CM

## 2020-12-18 DIAGNOSIS — R93.1 AGATSTON CORONARY ARTERY CALCIUM SCORE BETWEEN 100 AND 199: ICD-10-CM

## 2020-12-18 LAB — APO B100 SERPL-MCNC: 85 MG/DL (ref 55–140)

## 2020-12-18 PROCEDURE — 99213 OFFICE O/P EST LOW 20 MIN: CPT | Mod: 95,CR | Performed by: FAMILY MEDICINE

## 2020-12-18 ASSESSMENT — ENCOUNTER SYMPTOMS
DIARRHEA: 0
PALPITATIONS: 0
ORTHOPNEA: 0
FEVER: 0
DEPRESSION: 0
BRUISES/BLEEDS EASILY: 0
HEMOPTYSIS: 0
BLOOD IN STOOL: 0
DIZZINESS: 0
BLURRED VISION: 0
WHEEZING: 0
INSOMNIA: 0
SORE THROAT: 0
CHILLS: 0
SEIZURES: 0
VOMITING: 0
WEAKNESS: 0
MYALGIAS: 0
HEADACHES: 0
TREMORS: 0
NAUSEA: 0
FOCAL WEAKNESS: 0
DOUBLE VISION: 0
ABDOMINAL PAIN: 0
NERVOUS/ANXIOUS: 0
COUGH: 0
SHORTNESS OF BREATH: 0

## 2020-12-19 NOTE — PROGRESS NOTES
This visit was conducted via [employer] video  using secure and encrypted video conferencing technology due to covid restrictions  The patient was in a private location in the state of [place].    The patient's identity was confirmed and verbal consent was obtained for this virtual visit.     FOLLOW-UP VASCULAR MED VISIT  Subjective:   Jacky Paz is a 40 y.o. male who presents today 21  for   Chief Complaint   Patient presents with   • Follow-Up     FH, labs    Initially referred by PCP (LEONARDO Anderson) for evaluation and mgmt of HLD    HPI:      FH:   No interval concerns, does not that father  for biliary disease but had recently had 4vCABG at age 79.    Current treatment: High intensity statin   rosuva 40mg, zetia 10mg, plant sterols   Myalgias? No  Other adverse drug reactions? No  Lipid profile: as noted     Known FH with family hx of FH and CAD.    Baseline cholesterol off tx TC = 399 when age 11. Untreated has been 270s.    BP:   no current HTN dx or HTN-associated sx     Antiplatelet/anticoagulation: No     Clinical evidence of ASCVD:  None     Major RFs:     1) Age (Men>44, women>54):  no   2) Fhx early CAD (<55 men, <65 women):  yes   3) cigarette smoking:  No   4) high BP >139/89 or on tx: no   5) Low HDL-C (<40 men, <50 women):  No    Family History   Problem Relation Age of Onset   • Hyperlipidemia Mother         FH, FO=175 untreated   • Heart Disease Mother         4vCABG in 40s, heart valve replacement   • Hypertension Mother    • Other Mother         carotid artery disease, bilat   • Hyperlipidemia Father    • Diabetes Father    • Hypertension Father    • Heart Attack Father 70        70 -1st, 78 - 4vCABG   • Abdominal aortic aneurysm Father    • Other Father         rupture bile duct,    • Hyperlipidemia Sister         FH, untreated ZI=113   • Heart Attack Maternal Grandfather         60s,    • Hyperlipidemia Maternal Grandfather    • Heart Attack Paternal Grandfather           84   • Hyperlipidemia Sister         FH,  due to biking accident    • Heart Disease Cousin         maternal side, 5vCABG in 30s    • Hyperlipidemia Son         DR=922, dx age 11       Outpatient Encounter Medications as of 2020   Medication Sig Dispense Refill   • rosuvastatin (CRESTOR) 40 MG tablet Take 1 Tab by mouth every day. 90 Tab 1   • ezetimibe (ZETIA) 10 MG Tab Take 10 mg by mouth every day.     • Cholecalciferol (VITAMIN D-3) 125 MCG (5000 UT) Tab Take  by mouth. 30 Tab    • Plant Sterols and Stanols (CHOLESTOFF PO) Take  by mouth.       No facility-administered encounter medications on file as of 2020.      No Known Allergies     Social History     Tobacco Use   • Smoking status: Never Smoker   • Smokeless tobacco: Never Used   Substance Use Topics   • Alcohol use: No   • Drug use: No      DIET AND EXERCISE:  Weight Change: no changes   BMI Readings from Last 5 Encounters:   20 30.43 kg/m²   01/10/20 30.88 kg/m²   19 30.15 kg/m²   19 28.45 kg/m²   10/11/18 28.45 kg/m²      Diet: low fat  Exercise: moderate regular exercise program, 3mi 3x/week      Review of Systems   Constitutional: Negative for chills, fever and malaise/fatigue.   HENT: Negative for nosebleeds, sore throat and tinnitus.    Eyes: Negative for blurred vision and double vision.   Respiratory: Negative for cough, hemoptysis, shortness of breath and wheezing.    Cardiovascular: Negative for chest pain, palpitations, orthopnea and leg swelling.   Gastrointestinal: Negative for abdominal pain, blood in stool, diarrhea, melena, nausea and vomiting.   Genitourinary: Negative for hematuria.   Musculoskeletal: Negative for joint pain and myalgias.   Skin: Negative for itching and rash.   Neurological: Negative for dizziness, tremors, focal weakness, seizures, weakness and headaches.   Endo/Heme/Allergies: Does not bruise/bleed easily.   Psychiatric/Behavioral: Negative for depression. The patient is not  nervous/anxious and does not have insomnia.       Objective:     There were no vitals filed for this visit.   BP Readings from Last 4 Encounters:   08/21/20 126/64   01/10/20 125/62   06/14/19 118/58   04/19/19 122/63     There is no height or weight on file to calculate BMI.     Physical Exam   Constitutional: He is oriented to person, place, and time. He appears well-developed and well-nourished. He is cooperative. No distress.   HENT:   Head: Normocephalic and atraumatic.   Mouth/Throat: Oropharynx is clear and moist and mucous membranes are normal.   Eyes: Pupils are equal, round, and reactive to light. Conjunctivae are normal.   Neck: Trachea normal and normal range of motion. Neck supple. Normal carotid pulses and no JVD present. Carotid bruit is not present. No thyromegaly present.   Cardiovascular: Normal rate, regular rhythm, normal heart sounds and intact distal pulses. Exam reveals no gallop and no friction rub.   No murmur heard.  Pulses:       Carotid pulses are 2+ on the right side and 2+ on the left side.       Radial pulses are 2+ on the right side and 2+ on the left side.        Dorsalis pedis pulses are 2+ on the right side and 2+ on the left side.        Posterior tibial pulses are 2+ on the right side and 2+ on the left side.   Edema:    RLE: none     LLE: none   Spider telangectasia:       RLE:  None      LLE: none   Varicosities:         RLE: none      LLE: none   Corona phlebectatica:      RLE:  None        LLE:  None   Cording:         RLE:  None     LLE: None    Pulmonary/Chest: Effort normal and breath sounds normal. No respiratory distress.   Abdominal: Soft. Bowel sounds are normal. He exhibits no distension and no mass. There is no hepatosplenomegaly. There is no abdominal tenderness. There is no rebound and no guarding.   Musculoskeletal:         General: No edema.   Lymphadenopathy:     He has no cervical adenopathy.   Neurological: He is alert and oriented to person, place, and time.  No cranial nerve deficit. Coordination and gait normal.   Skin: Skin is warm and dry. He is not diaphoretic. No cyanosis. No pallor. Nails show no clubbing.   Psychiatric: He has a normal mood and affect.     Lab Results   Component Value Date    CHOLSTRLTOT 179 12/16/2020     (H) 12/16/2020    HDL 50 12/16/2020    TRIGLYCERIDE 135 12/16/2020    LDLPART 20.7 06/07/2019    LDLPART 971 06/07/2019    SMLLDL 522 06/07/2019    LHDLPART 5.8 06/07/2019    LVLDLPT 2.1 06/07/2019    LDLCHOL 71 06/07/2019    HDLSIZE 9.1 06/07/2019    VLDLSIZE 48.4 (H) 06/07/2019    HDLPART 30.7 (L) 06/07/2019           Apolipoprotein-B Latest Ref Range: 55 - 140 mg/dL 85         Lab Results   Component Value Date    SODIUM 140 12/16/2020    POTASSIUM 3.8 12/16/2020    CHLORIDE 106 12/16/2020    CO2 25 12/16/2020    GLUCOSE 91 12/16/2020    BUN 13 12/16/2020    CREATININE 0.91 12/16/2020    IFAFRICA >60 12/16/2020    IFNOTAFR >60 12/16/2020            VASCULAR IMAGING:     CT CAC scoring 5/18/18  Calcium score:  155.8        Ref. Range 11/28/2020 10:14   SARS-CoV-2 by PCR Latest Ref Range: NotDetected  DETECTED (AA)     Medical Decision Making:  Today's Assessment / Status / Plan:     1. Familial hypercholesterolemia     2. Elevated lipoprotein(a)     3. Agatston coronary artery calcium score between 100 and 199     4. Gilbert syndrome     5. Vitamin D deficiency     6. Bradycardia       Patient Type: Secondary Prevention    Etiology of Established CVD if Present:    1) Moderate atherosclerotic plaque burden by CT CAC scoring with score = 155    Lipid Management:   Qualifies for Statin Therapy Based on 2013 ACC/AHA Guidelines: yes, phenotypic FH  Calculated 10-Year Risk of ASCVD: N/A  Currently on Statin: Yes  hsCRP low risk = 0.5  CAC score = 155   8/2018: LDLP 1286, small LDLP 648 - elevated   9/2019: LDLP 971, small LDLP 522  Concordant LDLP/LDLC ratio = 12 - track lipid and/or apoB moving forward   NLA Risk Category: high:  LDL  >189   Tx goal: non-HDL-C <100,  LDL-C <70  (optional: apoB <80)   Target LDL-P <1000   At goal:  , apoB at goal   Tx plan:   - continue TLC   - consider genotyping in the future   - continue crestor 40mg  - continue zetia 10mg daily   - may qualify for nexletol vs PCSK9 inhibitor due to FH   - update labs June 2021, if stable, then annual     Blood Pressure Management:   Goal: ACC/AHA (2017) goal <130/80   Home BP at goal:  yes   Office BP at goal:  yes  Plan:   - continue home BP monitoring, reviewed correct technique:  Yes   - order 24h ABPM:  NO  - continue healthy lifestyle     Glycemic Status: Normal  - continue healthy lifestyle, monitor labs annually     Anti-Platelet/Anti-Coagulant Tx: N\A  - no indications based upon current evidence and age     Smoking: continued complete avoidance of all tobacco products     Physical Activity: continue healthy activity to improve CV fitness, see care instructions for additional details   - increase to >150min/week     Weight Management and Nutrition: Dietary plan was discussed with patient at this visit including DASH, low sodium and/or as outlined in care instructions   - reinforced weight loss strategies     Other:    1) subclinical moderate CAD based upon CAC scoring.  No symptoms   No indications of active disease.   After further review of currently available literature and evidence/guidelines, pt has no indication for further cardiac testing at this time as no interventions would be appropriate based upon his risk level, lack of high grade 3 vessel or LMA disease and age.  Available evidence would indicate optimal medical therapy appropriate for his age and established, stable disease.    - continue med mgmt   - CAC score at intermediate risk level and overall risk factors are stable    2) bradycardia - upper 40s, stable, no sx    3) BMI = 30  - focus on adjusting exercise and diet every 4-6 weeks     4) Gilbert's - stable total bilirubin levels in 2.0's  range, mild high ALT only   May have component of NAFLD due to BMI 30  - monitor over time since on statin,   - defer primary mgmt to PCP    5) vit D deficiency   - start Vit D3 5,000 units daily     Instructed to follow-up with PCP for remainder of adult medical needs: yes  We will partner with other providers in the management of established vascular disease and cardiometabolic risk factors.    Studies to Be Obtained:  Repeat CAC scoring in 2023   Labs to Be Obtained:  As noted     Follow up in: June if stable, then annual     Shahbaz Dominguez M.D.   [facility]   [facility]

## 2021-04-26 DIAGNOSIS — E78.01 FAMILIAL HYPERCHOLESTEROLEMIA: ICD-10-CM

## 2021-04-26 RX ORDER — ROSUVASTATIN CALCIUM 40 MG/1
TABLET, COATED ORAL
Qty: 90 TABLET | Refills: 1 | Status: SHIPPED | OUTPATIENT
Start: 2021-04-26 | End: 2021-10-22 | Stop reason: SDUPTHER

## 2021-06-18 ENCOUNTER — APPOINTMENT (OUTPATIENT)
Dept: VASCULAR LAB | Facility: MEDICAL CENTER | Age: 41
End: 2021-06-18
Attending: INTERNAL MEDICINE

## 2021-06-18 ASSESSMENT — ENCOUNTER SYMPTOMS
CLAUDICATION: 0
PALPITATIONS: 0
ORTHOPNEA: 0
NAUSEA: 0
BRUISES/BLEEDS EASILY: 0
CHILLS: 0
WEAKNESS: 0
FEVER: 0
COUGH: 0
MYALGIAS: 0

## 2021-06-18 NOTE — PROGRESS NOTES
This visit was conducted via [employer] video  using secure and encrypted video conferencing technology due to covid restrictions  The patient was in a private location in the state of [place].    The patient's identity was confirmed and verbal consent was obtained for this virtual visit.     FOLLOW-UP VASCULAR MED VISIT  Subjective:   Jacky Paz is a 40 y.o. male who presents today 21  for   No chief complaint on file.  Initially referred by PCP (LEONARDO Anderson) for evaluation and mgmt of HLD    HPI:      FH:   No interval concerns, does not that father  for biliary disease but had recently had 4vCABG at age 79.    Current treatment: High intensity statin   rosuva 40mg, zetia 10mg, plant sterols   Myalgias? No  Other adverse drug reactions? No  Lipid profile: as noted     Known FH with family hx of FH and CAD.    Baseline cholesterol off tx TC = 399 when age 11. Untreated has been 270s.    BP:   no current HTN dx or HTN-associated sx     Antiplatelet/anticoagulation: No     Clinical evidence of ASCVD:  None     Major RFs:     1) Age (Men>44, women>54):  no   2) Fhx early CAD (<55 men, <65 women):  yes   3) cigarette smoking:  No   4) high BP >139/89 or on tx: no   5) Low HDL-C (<40 men, <50 women):  No    Family History   Problem Relation Age of Onset   • Hyperlipidemia Mother         FH, BT=[id number] untreated   • Heart Disease Mother         4vCABG in 40s, heart valve replacement   • Hypertension Mother    • Other Mother         carotid artery disease, bilat   • Hyperlipidemia Father    • Diabetes Father    • Hypertension Father    • Heart Attack Father 70        70 -1st, 78 - 4vCABG   • Abdominal aortic aneurysm Father    • Other Father         rupture bile duct,    • Hyperlipidemia Sister         FH, untreated [id number]   • Heart Attack Maternal Grandfather         60s,    • Hyperlipidemia Maternal Grandfather    • Heart Attack Paternal Grandfather          84   • Hyperlipidemia Sister          FH,  due to biking accident    • Heart Disease Cousin         maternal side, 5vCABG in 30s    • Hyperlipidemia Son         YO=250, dx age 11       Outpatient Encounter Medications as of 2021   Medication Sig Dispense Refill   • rosuvastatin (CRESTOR) 40 MG tablet TAKE ONE TABLET BY MOUTH ONE TIME DAILY  90 tablet 1   • ezetimibe (ZETIA) 10 MG Tab Take 10 mg by mouth every day.     • Cholecalciferol (VITAMIN D-3) 125 MCG (5000 UT) Tab Take  by mouth. 30 Tab    • Plant Sterols and Stanols (CHOLESTOFF PO) Take  by mouth.       No facility-administered encounter medications on file as of 2021.     No Known Allergies     Social History     Tobacco Use   • Smoking status: Never Smoker   • Smokeless tobacco: Never Used   Substance Use Topics   • Alcohol use: No   • Drug use: No      DIET AND EXERCISE:  Weight Change: no changes   BMI Readings from Last 5 Encounters:   20 30.43 kg/m²   01/10/20 30.88 kg/m²   19 30.15 kg/m²   19 28.45 kg/m²   10/11/18 28.45 kg/m²      Diet: low fat  Exercise: moderate regular exercise program, 3mi 3x/week      Review of Systems   Constitutional: Negative for chills and fever.   Respiratory: Negative for cough.    Cardiovascular: Negative for chest pain, palpitations, orthopnea, claudication and leg swelling.   Gastrointestinal: Negative for nausea.   Musculoskeletal: Negative for myalgias.   Neurological: Negative for weakness.   Endo/Heme/Allergies: Does not bruise/bleed easily.      Objective:     There were no vitals filed for this visit.   BP Readings from Last 4 Encounters:   20 126/64   01/10/20 125/62   19 118/58   19 122/63     There is no height or weight on file to calculate BMI.     Physical Exam  Constitutional:       Appearance: He is well-developed.   HENT:      Head: Normocephalic and atraumatic.   Eyes:      Conjunctiva/sclera: Conjunctivae normal.      Pupils: Pupils are equal, round, and reactive to light.   Neck:       Thyroid: No thyromegaly.      Vascular: No JVD.   Cardiovascular:      Rate and Rhythm: Normal rate and regular rhythm.      Pulses:           Radial pulses are 2+ on the right side and 2+ on the left side.        Dorsalis pedis pulses are 2+ on the right side and 2+ on the left side.        Posterior tibial pulses are 2+ on the right side and 2+ on the left side.      Heart sounds: Normal heart sounds. No murmur heard.   No friction rub. No gallop.    Pulmonary:      Effort: Pulmonary effort is normal. No respiratory distress.      Breath sounds: Normal breath sounds.   Musculoskeletal:      Cervical back: Normal range of motion and neck supple.   Lymphadenopathy:      Cervical: No cervical adenopathy.   Skin:     General: Skin is warm and dry.   Neurological:      Mental Status: He is alert and oriented to person, place, and time.      Cranial Nerves: No cranial nerve deficit.       Lab Results   Component Value Date    CHOLSTRLTOT 179 12/16/2020     (H) 12/16/2020    HDL 50 12/16/2020    TRIGLYCERIDE 135 12/16/2020           Apolipoprotein-B Latest Ref Range: 55 - 140 mg/dL 85         Lab Results   Component Value Date    SODIUM 140 12/16/2020    POTASSIUM 3.8 12/16/2020    CHLORIDE 106 12/16/2020    CO2 25 12/16/2020    GLUCOSE 91 12/16/2020    BUN 13 12/16/2020    CREATININE 0.91 12/16/2020    IFAFRICA >60 12/16/2020    IFNOTAFR >60 12/16/2020            VASCULAR IMAGING:     CT CAC scoring 5/18/18  Coronary calcification:  LMA - 0.0  LCX 43.9  LAD 7.4  .5  PDA - 0.0   Calcium score:  155.8   The visualized portions of the lungs, mediastinum, and upper abdomen are within normal limits.    Medical Decision Making:  Today's Assessment / Status / Plan:     1. Familial hypercholesterolemia     2. Elevated lipoprotein(a)     3. Agatston coronary artery calcium score between 100 and 199     4. Gilbert syndrome     5. Vitamin D deficiency     6. Bradycardia       Patient Type: Secondary  Prevention    Etiology of Established CVD if Present:      1) subclinical moderate CAD based upon CAC scoring.  No symptoms   No indications of active disease.   After further review of currently available literature and evidence/guidelines, pt has no indication for further cardiac testing at this time as no interventions would be appropriate based upon his risk level, lack of high grade 3 vessel or LMA disease and age.  Available evidence would indicate optimal medical therapy appropriate for his age and established, stable disease.    - continue med mgmt   - CAC score at intermediate risk level and overall risk factors are stable    2) bradycardia - upper 40s, stable, no sx    Lipid Management:   Qualifies for Statin Therapy Based on 2013 ACC/AHA Guidelines: yes, phenotypic FH  Calculated 10-Year Risk of ASCVD: N/A  Currently on Statin: Yes  hsCRP low risk = 0.5  CAC score = 155   8/2018: LDLP 1286, small LDLP 648 - elevated   9/2019: LDLP 971, small LDLP 522  Concordant LDLP/LDLC ratio = 12 - track lipid and/or apoB moving forward   Tx goal: non-HDL-C <100,  LDL-C <70  (optional: apoB <80)   At goal:  ***  Tx plan:   - continue TLC   - consider genotyping in the future   Meds:   - continue crestor 40mg  - continue zetia 10mg daily   - may qualify for nexletol vs PCSK9 inhibitor due to FH   - update labs June 2021, if stable, then annual     Blood Pressure Management:   Goal: ACC/AHA (2017) goal <130/80   Home BP at goal:  yes   Office BP at goal:  yes  Plan:   - continue home BP monitoring, reviewed correct technique:  Yes   - order 24h ABPM:  NO  - continue healthy lifestyle     Glycemic Status: Normal  - continue healthy lifestyle, monitor labs annually     Anti-Platelet/Anti-Coagulant Tx: N\A  - no indications based upon current evidence and age     Smoking: continued complete avoidance of all tobacco products     Physical Activity: continue healthy activity to improve CV fitness, see care instructions for  additional details   - increase to >150min/week     Weight Management and Nutrition: Dietary plan was discussed with patient at this visit including DASH, low sodium and/or as outlined in care instructions   - reinforced weight loss strategies     Other:    #BMI = 30  - focus on adjusting exercise and diet every 4-6 weeks     #Gilbert's - stable total bilirubin levels in 2.0's range, mild high ALT only   May have component of NAFLD due to BMI 30  - monitor over time since on statin,   - defer primary mgmt to PCP    #vit D deficiency   - start Vit D3 5,000 units daily     Instructed to follow-up with PCP for remainder of adult medical needs: yes  We will partner with other providers in the management of established vascular disease and cardiometabolic risk factors.    Studies to Be Obtained:  Repeat CAC scoring in 2023   Labs to Be Obtained:  As noted     Follow up in: June if stable, then annual     Shahbaz Dominguez M.D.   [facility]   [facility]

## 2021-08-05 DIAGNOSIS — E55.9 VITAMIN D DEFICIENCY: ICD-10-CM

## 2021-08-05 DIAGNOSIS — E78.01 FAMILIAL HYPERCHOLESTEROLEMIA: ICD-10-CM

## 2021-08-05 RX ORDER — EZETIMIBE 10 MG/1
10 TABLET ORAL DAILY
Qty: 30 TABLET | Refills: 6 | Status: SHIPPED | OUTPATIENT
Start: 2021-08-05 | End: 2021-09-08 | Stop reason: SDUPTHER

## 2021-08-05 RX ORDER — EZETIMIBE 10 MG/1
10 TABLET ORAL DAILY
Qty: 30 TABLET | Status: CANCELLED | OUTPATIENT
Start: 2021-08-05

## 2021-08-14 ENCOUNTER — HOSPITAL ENCOUNTER (OUTPATIENT)
Dept: LAB | Facility: MEDICAL CENTER | Age: 41
End: 2021-08-14
Attending: NURSE PRACTITIONER

## 2021-08-14 DIAGNOSIS — E55.9 VITAMIN D DEFICIENCY: ICD-10-CM

## 2021-08-14 DIAGNOSIS — E78.01 FAMILIAL HYPERCHOLESTEROLEMIA: ICD-10-CM

## 2021-08-14 LAB
25(OH)D3 SERPL-MCNC: 33 NG/ML (ref 30–100)
ALBUMIN SERPL BCP-MCNC: 4.3 G/DL (ref 3.2–4.9)
ALBUMIN/GLOB SERPL: 1.5 G/DL
ALP SERPL-CCNC: 86 U/L (ref 30–99)
ALT SERPL-CCNC: 59 U/L (ref 2–50)
ANION GAP SERPL CALC-SCNC: 12 MMOL/L (ref 7–16)
AST SERPL-CCNC: 42 U/L (ref 12–45)
BILIRUB SERPL-MCNC: 1.2 MG/DL (ref 0.1–1.5)
BUN SERPL-MCNC: 15 MG/DL (ref 8–22)
CALCIUM SERPL-MCNC: 9.4 MG/DL (ref 8.5–10.5)
CHLORIDE SERPL-SCNC: 105 MMOL/L (ref 96–112)
CHOLEST SERPL-MCNC: 164 MG/DL (ref 100–199)
CO2 SERPL-SCNC: 24 MMOL/L (ref 20–33)
CREAT SERPL-MCNC: 0.94 MG/DL (ref 0.5–1.4)
FASTING STATUS PATIENT QL REPORTED: NORMAL
GLOBULIN SER CALC-MCNC: 2.8 G/DL (ref 1.9–3.5)
GLUCOSE SERPL-MCNC: 104 MG/DL (ref 65–99)
HDLC SERPL-MCNC: 46 MG/DL
LDLC SERPL CALC-MCNC: 95 MG/DL
POTASSIUM SERPL-SCNC: 4.3 MMOL/L (ref 3.6–5.5)
PROT SERPL-MCNC: 7.1 G/DL (ref 6–8.2)
SODIUM SERPL-SCNC: 141 MMOL/L (ref 135–145)
TRIGL SERPL-MCNC: 115 MG/DL (ref 0–149)

## 2021-08-14 PROCEDURE — 80053 COMPREHEN METABOLIC PANEL: CPT

## 2021-08-14 PROCEDURE — 80061 LIPID PANEL: CPT

## 2021-08-14 PROCEDURE — 82306 VITAMIN D 25 HYDROXY: CPT

## 2021-08-14 PROCEDURE — 36415 COLL VENOUS BLD VENIPUNCTURE: CPT

## 2021-08-19 ENCOUNTER — OFFICE VISIT (OUTPATIENT)
Dept: VASCULAR LAB | Facility: MEDICAL CENTER | Age: 41
End: 2021-08-19
Attending: FAMILY MEDICINE

## 2021-08-19 VITALS
HEART RATE: 58 BPM | SYSTOLIC BLOOD PRESSURE: 130 MMHG | HEIGHT: 71 IN | DIASTOLIC BLOOD PRESSURE: 73 MMHG | BODY MASS INDEX: 32.48 KG/M2 | WEIGHT: 232 LBS

## 2021-08-19 DIAGNOSIS — R00.1 BRADYCARDIA: ICD-10-CM

## 2021-08-19 DIAGNOSIS — E80.4 GILBERT SYNDROME: ICD-10-CM

## 2021-08-19 DIAGNOSIS — R93.1 AGATSTON CORONARY ARTERY CALCIUM SCORE BETWEEN 100 AND 199: ICD-10-CM

## 2021-08-19 DIAGNOSIS — R73.01 IFG (IMPAIRED FASTING GLUCOSE): ICD-10-CM

## 2021-08-19 DIAGNOSIS — E78.41 ELEVATED LIPOPROTEIN(A): ICD-10-CM

## 2021-08-19 DIAGNOSIS — Z91.89 OTHER SPECIFIED PERSONAL RISK FACTORS, NOT ELSEWHERE CLASSIFIED: ICD-10-CM

## 2021-08-19 DIAGNOSIS — E78.01 FAMILIAL HYPERCHOLESTEROLEMIA: ICD-10-CM

## 2021-08-19 PROCEDURE — 99214 OFFICE O/P EST MOD 30 MIN: CPT | Performed by: FAMILY MEDICINE

## 2021-08-19 PROCEDURE — 99212 OFFICE O/P EST SF 10 MIN: CPT

## 2021-08-19 ASSESSMENT — ENCOUNTER SYMPTOMS
DIZZINESS: 0
NAUSEA: 0
BLOOD IN STOOL: 0
CHILLS: 0
WHEEZING: 0
SORE THROAT: 0
DIARRHEA: 0
COUGH: 0
HEMOPTYSIS: 0
WEAKNESS: 0
TREMORS: 0
HEADACHES: 0
VOMITING: 0
ORTHOPNEA: 0
INSOMNIA: 0
BRUISES/BLEEDS EASILY: 0
NERVOUS/ANXIOUS: 0
SHORTNESS OF BREATH: 0
DEPRESSION: 0
ABDOMINAL PAIN: 0
SEIZURES: 0
FEVER: 0
FOCAL WEAKNESS: 0
DOUBLE VISION: 0
PALPITATIONS: 0
BLURRED VISION: 0
MYALGIAS: 0

## 2021-08-19 NOTE — PROGRESS NOTES
FOLLOW-UP VASCULAR MED VISIT  Subjective:   Jacky Paz is a 40 y.o. male who presents today 22  for   Chief Complaint   Patient presents with   • Follow-Up   Initially referred by PCP (LEONARDO Anderson) for evaluation and mgmt of HLD    HPI:      FH:   No interval concerns   Current treatment: High intensity statin   rosuva 40mg, zetia 10mg, plant sterols   Myalgias? No  Other adverse drug reactions? No  Lipid profile: as noted   Known FH with family hx of FH and CAD.    Baseline cholesterol off tx TC = 399 when age 11. Untreated has been 270s.    BP:   no current HTN dx or HTN-associated sx     Antiplatelet/anticoagulation: No     Family History   Problem Relation Age of Onset   • Hyperlipidemia Mother         FH, LQ=883 untreated   • Heart Disease Mother         4vCABG in 40s, heart valve replacement   • Hypertension Mother    • Other Mother         carotid artery disease, bilat   • Hyperlipidemia Father    • Diabetes Father    • Hypertension Father    • Heart Attack Father 70        70 -1st, 78 - 4vCABG   • Abdominal aortic aneurysm Father    • Other Father         rupture bile duct,  (78)   • Hyperlipidemia Sister         FH, untreated [id number]   • Heart Attack Maternal Grandfather         60s,    • Hyperlipidemia Maternal Grandfather    • Heart Attack Paternal Grandfather          84   • Hyperlipidemia Sister         FH,  due to biking accident    • Heart Disease Cousin         maternal side, 5vCABG in 30s    • Hyperlipidemia Son         CY=387, dx age 11     Current Outpatient Medications:   •  ezetimibe, 10 mg, Oral, DAILY, Taking  •  rosuvastatin, TAKE ONE TABLET BY MOUTH ONE TIME DAILY , Taking  •  Vitamin D-3, Take  by mouth., Taking  •  Plant Sterols and Stanols (CHOLESTOFF PO), Take  by mouth., Taking       Social History     Tobacco Use   • Smoking status: Never Smoker   • Smokeless tobacco: Never Used   Substance Use Topics   • Alcohol use: No   • Drug use: No      DIET AND  "EXERCISE:  Weight Change: no changes   BMI Readings from Last 5 Encounters:   08/19/21 32.36 kg/m²   08/21/20 30.43 kg/m²   01/10/20 30.88 kg/m²   06/14/19 30.15 kg/m²   04/19/19 28.45 kg/m²      Diet: low fat  Exercise: moderate regular exercise program, 3mi 3x/week      Review of Systems   Constitutional: Negative for chills, fever and malaise/fatigue.   HENT: Negative for nosebleeds, sore throat and tinnitus.    Eyes: Negative for blurred vision and double vision.   Respiratory: Negative for cough, hemoptysis, shortness of breath and wheezing.    Cardiovascular: Negative for chest pain, palpitations, orthopnea and leg swelling.   Gastrointestinal: Negative for abdominal pain, blood in stool, diarrhea, melena, nausea and vomiting.   Genitourinary: Negative for hematuria.   Musculoskeletal: Negative for joint pain and myalgias.   Skin: Negative for itching and rash.   Neurological: Negative for dizziness, tremors, focal weakness, seizures, weakness and headaches.   Endo/Heme/Allergies: Does not bruise/bleed easily.   Psychiatric/Behavioral: Negative for depression. The patient is not nervous/anxious and does not have insomnia.       Objective:     Vitals:    08/19/21 1507 08/19/21 1510   BP: 131/65 130/73   BP Location: Left arm Left arm   Patient Position: Sitting Sitting   BP Cuff Size: Adult Adult   Pulse: 60 (!) 58   Weight: 105 kg (232 lb)    Height: 1.803 m (5' 11\")       BP Readings from Last 4 Encounters:   08/19/21 130/73   08/21/20 126/64   01/10/20 125/62   06/14/19 118/58     Body mass index is 32.36 kg/m².     Physical Exam  Constitutional:       General: He is not in acute distress.     Appearance: He is well-developed. He is not diaphoretic.   HENT:      Head: Normocephalic and atraumatic.   Eyes:      Conjunctiva/sclera: Conjunctivae normal.      Pupils: Pupils are equal, round, and reactive to light.   Neck:      Thyroid: No thyromegaly.      Vascular: Normal carotid pulses. No carotid bruit or " JVD.      Trachea: Trachea normal.   Cardiovascular:      Rate and Rhythm: Normal rate and regular rhythm.      Pulses:           Carotid pulses are 2+ on the right side and 2+ on the left side.       Radial pulses are 2+ on the right side and 2+ on the left side.        Dorsalis pedis pulses are 2+ on the right side and 2+ on the left side.        Posterior tibial pulses are 2+ on the right side and 2+ on the left side.      Heart sounds: Normal heart sounds. No murmur heard.   No friction rub. No gallop.       Comments: Edema:    RLE: none     LLE: none   Spider telangectasia:       RLE:  None      LLE: none   Varicosities:         RLE: none      LLE: none   Corona phlebectatica:      RLE:  None        LLE:  None   Cording:         RLE:  None     LLE: None   Pulmonary:      Effort: Pulmonary effort is normal. No respiratory distress.      Breath sounds: Normal breath sounds.   Abdominal:      General: Bowel sounds are normal. There is no distension.      Palpations: Abdomen is soft. There is no mass.      Tenderness: There is no abdominal tenderness. There is no guarding or rebound.   Musculoskeletal:      Cervical back: Normal range of motion and neck supple.   Lymphadenopathy:      Cervical: No cervical adenopathy.   Skin:     General: Skin is warm and dry.      Coloration: Skin is not pale.      Nails: There is no clubbing.   Neurological:      Mental Status: He is alert and oriented to person, place, and time.      Cranial Nerves: No cranial nerve deficit.      Coordination: Coordination normal.      Gait: Gait normal.   Psychiatric:         Behavior: Behavior is cooperative.       Lab Results   Component Value Date    CHOLSTRLTOT 164 08/14/2021    LDL 95 08/14/2021    HDL 46 08/14/2021    TRIGLYCERIDE 115 08/14/2021           Apolipoprotein-B Latest Ref Range: 55 - 140 mg/dL 85         Lab Results   Component Value Date    SODIUM 141 08/14/2021    POTASSIUM 4.3 08/14/2021    CHLORIDE 105 08/14/2021    CO2 24  08/14/2021    GLUCOSE 104 (H) 08/14/2021    BUN 15 08/14/2021    CREATININE 0.94 08/14/2021    IFAFRICA >60 08/14/2021    IFNOTAFR >60 08/14/2021            VASCULAR IMAGING:     CT CAC scoring 5/18/18  Coronary calcification:  LMA - 0.0  LCX 43.9  LAD 7.4  .5  PDA - 0.0   Calcium score:  155.8     Ref. Range 11/28/2020 10:14   SARS-CoV-2 by PCR Latest Ref Range: NotDetected  DETECTED (AA)     Medical Decision Making:  Today's Assessment / Status / Plan:     1. Familial hypercholesterolemia  Comp Metabolic Panel    Lipid Profile   2. Elevated lipoprotein(a)     3. Agatston coronary artery calcium score between 100 and 199     4. Gilbert syndrome     5. Bradycardia     6. Other specified personal risk factors, not elsewhere classified  CT-CARDIAC SCORING   7. IFG (impaired fasting glucose)  HEMOGLOBIN A1C (Glycohemoglobin GHB Total/A1C with MBG Estimate)     Patient Type: Secondary Prevention    Etiology of Established CVD if Present:      1) subclinical CAD as evidenced by high CAC  - multiple vessels, >75th%ile for age/gender   No symptoms or prior ASCVD events, no further cardiac stress testing indicated     - continue med mgmt   - repeat CAC prior to next visit for assessment     Lipid Management:   Qualifies for Statin Therapy Based on 2013 ACC/AHA Guidelines: yes, phenotypic FH  Calculated 10-Year Risk of ASCVD: N/A  Currently on Statin: Yes  hsCRP low risk = 0.5  CAC score (2018) = 155   8/2018: LDLP 1286, small LDLP 648 - elevated   9/2019: LDLP 971, small LDLP 522  Concordant LDLP/LDLC ratio = 12 - track lipid and/or apoB moving forward   Target LDL-P <1000   At goal:  , apoB at goal   Tx plan:   - continue TLC   - consider genotyping in the future   - monitor labs Q6-12mo   meds:  - continue crestor 40mg  - continue zetia 10mg daily   - may qualify for nexletol vs PCSK9 inhibitor due to FH     Blood Pressure Management:   Goal: ACC/AHA (2017) goal <130/80   Home BP at goal:  yes   Office BP at  goal:  yes  Plan:   - continue home BP monitoring, reviewed correct technique:  Yes   - order 24h ABPM:  NO  - continue healthy lifestyle     Glycemic Status: Normal, mild IFG = 104  - continue healthy lifestyle, monitor labs annually     Anti-Platelet/Anti-Coagulant Tx: N\A  - no indications based upon current evidence and age     Smoking: continued complete avoidance of all tobacco products     Physical Activity: continue healthy activity to improve CV fitness, see care instructions for additional details   - increase to >150min/week     Weight Management and Nutrition: Dietary plan was discussed with patient at this visit including DASH, low sodium and/or as outlined in care instructions   - reinforced weight loss strategies     Other:    #  bradycardia - upper 40s, stable, no sx    # BMI = 32  - focus on adjusting exercise and diet every 4-6 weeks     # Gilbert's - stable total bilirubin levels in 2.0's range, mild high ALT only   May have component of NAFLD due to BMI 30  - monitor over time since on statin,   - defer primary mgmt to PCP    # vit D deficiency   - cont Vit D3 5,000 units daily     Instructed to follow-up with PCP for remainder of adult medical needs: yes  We will partner with other providers in the management of established vascular disease and cardiometabolic risk factors.    Studies to Be Obtained:  Repeat CAC scoring in 1/2022  Labs to Be Obtained:  As noted     Follow up in: Feb     Shahbaz Dominguez M.D.   [facility]   [facility]   [phone]

## 2021-09-08 DIAGNOSIS — E78.01 FAMILIAL HYPERCHOLESTEROLEMIA: ICD-10-CM

## 2021-09-09 RX ORDER — EZETIMIBE 10 MG/1
10 TABLET ORAL DAILY
Qty: 30 TABLET | Refills: 6 | Status: SHIPPED | OUTPATIENT
Start: 2021-09-09 | End: 2022-04-12

## 2022-01-13 ENCOUNTER — HOSPITAL ENCOUNTER (OUTPATIENT)
Dept: RADIOLOGY | Facility: MEDICAL CENTER | Age: 42
End: 2022-01-13
Attending: FAMILY MEDICINE
Payer: COMMERCIAL

## 2022-01-13 DIAGNOSIS — Z91.89 OTHER SPECIFIED PERSONAL RISK FACTORS, NOT ELSEWHERE CLASSIFIED: ICD-10-CM

## 2022-01-13 PROCEDURE — 4410556 CT-CARDIAC SCORING (SELF PAY ONLY)

## 2022-02-11 ENCOUNTER — HOSPITAL ENCOUNTER (OUTPATIENT)
Dept: LAB | Facility: MEDICAL CENTER | Age: 42
End: 2022-02-11
Attending: FAMILY MEDICINE

## 2022-02-11 DIAGNOSIS — E78.01 FAMILIAL HYPERCHOLESTEROLEMIA: ICD-10-CM

## 2022-02-11 DIAGNOSIS — R73.01 IFG (IMPAIRED FASTING GLUCOSE): ICD-10-CM

## 2022-02-11 LAB
ALBUMIN SERPL BCP-MCNC: 4.8 G/DL (ref 3.2–4.9)
ALBUMIN/GLOB SERPL: 2 G/DL
ALP SERPL-CCNC: 82 U/L (ref 30–99)
ALT SERPL-CCNC: 48 U/L (ref 2–50)
ANION GAP SERPL CALC-SCNC: 10 MMOL/L (ref 7–16)
AST SERPL-CCNC: 41 U/L (ref 12–45)
BILIRUB SERPL-MCNC: 2.9 MG/DL (ref 0.1–1.5)
BUN SERPL-MCNC: 18 MG/DL (ref 8–22)
CALCIUM SERPL-MCNC: 9.8 MG/DL (ref 8.5–10.5)
CHLORIDE SERPL-SCNC: 104 MMOL/L (ref 96–112)
CHOLEST SERPL-MCNC: 124 MG/DL (ref 100–199)
CO2 SERPL-SCNC: 24 MMOL/L (ref 20–33)
CREAT SERPL-MCNC: 0.96 MG/DL (ref 0.5–1.4)
EST. AVERAGE GLUCOSE BLD GHB EST-MCNC: 97 MG/DL
FASTING STATUS PATIENT QL REPORTED: NORMAL
GLOBULIN SER CALC-MCNC: 2.4 G/DL (ref 1.9–3.5)
GLUCOSE SERPL-MCNC: 86 MG/DL (ref 65–99)
HBA1C MFR BLD: 5 % (ref 4–5.6)
HDLC SERPL-MCNC: 48 MG/DL
LDLC SERPL CALC-MCNC: 62 MG/DL
POTASSIUM SERPL-SCNC: 4.3 MMOL/L (ref 3.6–5.5)
PROT SERPL-MCNC: 7.2 G/DL (ref 6–8.2)
SODIUM SERPL-SCNC: 138 MMOL/L (ref 135–145)
TRIGL SERPL-MCNC: 68 MG/DL (ref 0–149)

## 2022-02-11 PROCEDURE — 80053 COMPREHEN METABOLIC PANEL: CPT

## 2022-02-11 PROCEDURE — 80061 LIPID PANEL: CPT

## 2022-02-11 PROCEDURE — 36415 COLL VENOUS BLD VENIPUNCTURE: CPT

## 2022-02-11 PROCEDURE — 83036 HEMOGLOBIN GLYCOSYLATED A1C: CPT

## 2022-02-17 ENCOUNTER — OFFICE VISIT (OUTPATIENT)
Dept: VASCULAR LAB | Facility: MEDICAL CENTER | Age: 42
End: 2022-02-17
Attending: FAMILY MEDICINE

## 2022-02-17 VITALS
DIASTOLIC BLOOD PRESSURE: 63 MMHG | HEIGHT: 72 IN | WEIGHT: 223 LBS | SYSTOLIC BLOOD PRESSURE: 126 MMHG | BODY MASS INDEX: 30.2 KG/M2 | HEART RATE: 45 BPM

## 2022-02-17 DIAGNOSIS — E80.4 GILBERT SYNDROME: ICD-10-CM

## 2022-02-17 DIAGNOSIS — E78.01 FAMILIAL HYPERCHOLESTEROLEMIA: ICD-10-CM

## 2022-02-17 DIAGNOSIS — R93.1 AGATSTON CORONARY ARTERY CALCIUM SCORE BETWEEN 100 AND 199: ICD-10-CM

## 2022-02-17 DIAGNOSIS — E78.41 ELEVATED LIPOPROTEIN(A): ICD-10-CM

## 2022-02-17 PROCEDURE — 99214 OFFICE O/P EST MOD 30 MIN: CPT | Performed by: FAMILY MEDICINE

## 2022-02-17 PROCEDURE — 99212 OFFICE O/P EST SF 10 MIN: CPT

## 2022-02-17 ASSESSMENT — ENCOUNTER SYMPTOMS
ORTHOPNEA: 0
PALPITATIONS: 0
NAUSEA: 0
FEVER: 0
MYALGIAS: 0
CLAUDICATION: 0
BRUISES/BLEEDS EASILY: 0
WEAKNESS: 0
CHILLS: 0
COUGH: 0

## 2022-02-17 NOTE — PROGRESS NOTES
FOLLOW-UP VASCULAR MED VISIT  Subjective:   Jacky Paz is a male who presents 23  for   Chief Complaint   Patient presents with   • Follow-Up   Initially referred by PCP (LEONARDO Anderson) for evaluation and mgmt of HLD      Subjective     HPI:      FH:  Worried about interval CAC scoring.  Had labs too.  Tolerating med, no sx.    Current treatment: High intensity statin   rosuva 40mg, zetia 10mg, plant sterols   Known FH with family hx of FH and CAD.    Baseline cholesterol off tx TC = 399 when age 11. Untreated has been 270s.    Antiplatelet/anticoagulation: No     Family History   Problem Relation Age of Onset   • Hyperlipidemia Mother         FH, MI=407 untreated   • Heart Disease Mother         4vCABG in 40s, heart valve replacement   • Hypertension Mother    • Other Mother         carotid artery disease, bilat   • Hyperlipidemia Father    • Diabetes Father    • Hypertension Father    • Heart Attack Father 70        70 -1st, 78 - 4vCABG   • Abdominal aortic aneurysm Father    • Other Father         rupture bile duct,  (78)   • Hyperlipidemia Sister         FH, untreated HJ=543   • Heart Attack Maternal Grandfather         60s,    • Hyperlipidemia Maternal Grandfather    • Heart Attack Paternal Grandfather          84   • Hyperlipidemia Sister         FH,  due to biking accident    • Heart Disease Cousin         maternal side, 5vCABG in 30s    • Hyperlipidemia Son         LF=371, dx age 11     Current Outpatient Medications:   •  rosuvastatin, 40 mg, Oral, DAILY, Taking  •  ezetimibe, 10 mg, Oral, DAILY, Taking  •  Vitamin D-3, Take  by mouth., Taking  •  Plant Sterols and Stanols (CHOLESTOFF PO), Take  by mouth., Taking       Social History     Tobacco Use   • Smoking status: Never Smoker   • Smokeless tobacco: Never Used   Substance Use Topics   • Alcohol use: No   • Drug use: No      DIET AND EXERCISE:  Weight Change: no changes   BMI Readings from Last 5 Encounters:   22 30.24  kg/m²   08/19/21 32.36 kg/m²   08/21/20 30.43 kg/m²   01/10/20 30.88 kg/m²   06/14/19 30.15 kg/m²      Diet: low fat  Exercise: moderate regular exercise program, 3mi 3x/week      Review of Systems   Constitutional: Negative for chills and fever.   Respiratory: Negative for cough.    Cardiovascular: Negative for chest pain, palpitations, orthopnea, claudication and leg swelling.   Gastrointestinal: Negative for nausea.   Musculoskeletal: Negative for myalgias.   Neurological: Negative for weakness.   Endo/Heme/Allergies: Does not bruise/bleed easily.          Objective        Objective:     Vitals:    02/17/22 1517 02/17/22 1520   BP: 126/64 126/63   BP Location: Left arm Left arm   Patient Position: Sitting Sitting   BP Cuff Size: Adult Adult   Pulse: (!) 53 (!) 45   Weight: 101 kg (223 lb)    Height: 1.829 m (6')       BP Readings from Last 4 Encounters:   02/17/22 126/63   08/19/21 130/73   08/21/20 126/64   01/10/20 125/62     Body mass index is 30.24 kg/m².     Physical Exam  Constitutional:       General: He is not in acute distress.     Appearance: He is well-developed. He is not diaphoretic.   HENT:      Head: Normocephalic.   Eyes:      Conjunctiva/sclera: Conjunctivae normal.   Pulmonary:      Effort: Pulmonary effort is normal. No respiratory distress.   Skin:     Coloration: Skin is not pale.      Findings: No rash.   Neurological:      Mental Status: He is alert and oriented to person, place, and time.      Cranial Nerves: No cranial nerve deficit.   Psychiatric:         Behavior: Behavior normal.       Lab Results   Component Value Date    CHOLSTRLTOT 124 02/11/2022    LDL 62 02/11/2022    HDL 48 02/11/2022    TRIGLYCERIDE 68 02/11/2022         No results found for: LIPOPROTA     Lab Results   Component Value Date/Time    APOB 85 12/16/2020 08:20 AM         Lab Results   Component Value Date    HBA1C 5.0 02/11/2022      Lab Results   Component Value Date    SODIUM 138 02/11/2022    POTASSIUM 4.3  02/11/2022    CHLORIDE 104 02/11/2022    CO2 24 02/11/2022    GLUCOSE 86 02/11/2022    BUN 18 02/11/2022    CREATININE 0.96 02/11/2022    IFAFRICA >60 02/11/2022    IFNOTAFR >60 02/11/2022            VASCULAR IMAGING:     CT CAC scoring 5/18/18  Coronary calcification:  LMA - 0.0  LCX 43.9  LAD 7.4  .5  PDA - 0.0   Calcium score:  155.8    Coronary calcification:  LMA - 0.0  LCX - 64.6  LAD - 42.4  RCA - 226.0  PDA - 0.0   Total Calcium Score: 333.1   Percentile: Calcium score is above the 90th percentile for the patient's age and sex.   Other findings:  Heart: Normal size.  Lungs: Clear.  Mediastinum: Normal.  Upper abdomen: Normal.               Medical Decision Making:  Today's Assessment / Status / Plan:     1. Familial hypercholesterolemia  Lipoprotein (a)   2. Elevated lipoprotein(a)     3. Agatston coronary artery calcium score between 100 and 199     4. Gilbert syndrome     5. BMI 30.0-30.9,adult       Patient Type: Secondary Prevention    Etiology of Established CVD if Present:      1) subclinical CAD as evidenced by high CAC  - multiple vessels, >75th%ile for age/gender   No symptoms or prior ASCVD events, no further cardiac stress testing indicated     - continue med mgmt   - repeat CAC prior to next visit for assessment     Lipid Management:   Qualifies for Statin Therapy Based on 2013 ACC/AHA Guidelines: yes, phenotypic FH  The ASCVD Risk score (Shawn DANIKA Jr, et al., 2013) failed to calculate. LDL>190  Currently on Statin: Yes  hsCRP low risk = 0.5  CAC score (2018) = 155   CAC (2022) = 333 (>90th%ile for age/gender) - indicating higher risk for short-term/long-term CVD - overall should have slight increase of CAC over time due to delipidation and calcification of existing plaque induced by statin, but he shows doubling of CAC and more extensive burden in RCA - LMA remains zero score.    - no current symptoms, no indications for functional stress testing   - no lp(a) to date    Goal:  LDL-C >50%  reduction and LDL-C <100,  non-HDL-C <130 (optional apoB <9)   At goal:  Yes, 2/2022   Tx plan:   - continue TLC   - update lp(a) now - will call results   - monitor labs Q6-12mo   meds:  - continue rosuva 40mg  - continue zetia 10mg daily   - consider pcsk9i based upon CAC >300, FH, and marlon if lp(a) more than mild elevated - reviewed potential use, cost, websites with patient     Blood Pressure Management:   Goal: ACC/AHA (2017) goal <130/80   Home BP at goal:  yes   Office BP at goal:  yes  Plan:   - continue home BP monitoring, reviewed correct technique:  Yes   - order 24h ABPM:  NO  - continue healthy lifestyle     Glycemic Status: Normal, mild IFG = 104  - continue healthy lifestyle, monitor labs annually     Anti-Platelet/Anti-Coagulant Tx: N\A  - no indications based upon current evidence and age     LIFESTYLE INTERVENTIONS:    SMOKING:   reports that he has never smoked. He has never used smokeless tobacco.   - continued complete avoidance of all tobacco products     PHYSICAL ACTIVITY: continue healthy activity to improve CV fitness.  In general, targeting >150min/week of moderate-level activity.  Push to 300+min/week for weight mgmt and improved CV health     WEIGHT MANAGEMENT AND NUTRITION: Dietary plan was discussed with patient at this visit including [place] diet plan      Other:    #  bradycardia - upper 40s, stable, no sx    # BMI = 32  - focus on adjusting exercise and diet every 4-6 weeks     # Gilbert's - stable total bilirubin levels in 2.0's range, ALT, AST wnl   -May have component of NAFLD due to BMI 30  - unlikely statin-related  - continue healthy diet, activity, lower fat and weight   - monitor Q6mo, if worsening consider PCP and/or GI input     # vit D deficiency  cont Vit D3 5,000 units daily     Instructed to follow-up with PCP for remainder of adult medical needs: yes  We will partner with other providers in the management of established vascular disease and cardiometabolic risk  factors.    Studies to Be Obtained:  Possible repeat CAC in 2025   Labs to Be Obtained:  As noted     Follow up in: 6mo    Shahbaz Dominguez M.D.   [facility]   [facility]   [phone]

## 2022-04-11 DIAGNOSIS — E78.01 FAMILIAL HYPERCHOLESTEROLEMIA: ICD-10-CM

## 2022-04-12 RX ORDER — EZETIMIBE 10 MG/1
10 TABLET ORAL DAILY
Qty: 30 TABLET | Refills: 6 | Status: SHIPPED | OUTPATIENT
Start: 2022-04-12 | End: 2023-07-26

## 2022-04-12 RX ORDER — EZETIMIBE 10 MG/1
TABLET ORAL
Qty: 30 TABLET | Refills: 6 | Status: SHIPPED | OUTPATIENT
Start: 2022-04-12 | End: 2022-05-12 | Stop reason: SDUPTHER

## 2022-05-12 DIAGNOSIS — E78.01 FAMILIAL HYPERCHOLESTEROLEMIA: ICD-10-CM

## 2022-05-12 RX ORDER — EZETIMIBE 10 MG/1
10 TABLET ORAL
Qty: 90 TABLET | Refills: 1 | Status: SHIPPED | OUTPATIENT
Start: 2022-05-12 | End: 2023-07-26

## 2022-08-15 ENCOUNTER — HOSPITAL ENCOUNTER (OUTPATIENT)
Dept: LAB | Facility: MEDICAL CENTER | Age: 42
End: 2022-08-15
Attending: FAMILY MEDICINE

## 2022-08-15 DIAGNOSIS — E78.01 FAMILIAL HYPERCHOLESTEROLEMIA: ICD-10-CM

## 2022-08-15 PROCEDURE — 83695 ASSAY OF LIPOPROTEIN(A): CPT

## 2022-08-15 PROCEDURE — 36415 COLL VENOUS BLD VENIPUNCTURE: CPT

## 2022-08-17 LAB — LPA SERPL-MCNC: 84 MG/DL

## 2022-08-18 ENCOUNTER — OFFICE VISIT (OUTPATIENT)
Dept: VASCULAR LAB | Facility: MEDICAL CENTER | Age: 42
End: 2022-08-18
Payer: COMMERCIAL

## 2022-08-18 DIAGNOSIS — E78.41 ELEVATED LIPOPROTEIN(A): ICD-10-CM

## 2022-08-18 DIAGNOSIS — E78.01 FAMILIAL HYPERCHOLESTEROLEMIA: ICD-10-CM

## 2022-08-18 PROCEDURE — 99213 OFFICE O/P EST LOW 20 MIN: CPT | Performed by: FAMILY MEDICINE

## 2022-08-18 RX ORDER — ASPIRIN 81 MG/1
81 TABLET ORAL DAILY
Qty: 30 TABLET | COMMUNITY

## 2022-08-18 ASSESSMENT — ENCOUNTER SYMPTOMS
COUGH: 0
CLAUDICATION: 0
MYALGIAS: 0
NAUSEA: 0
PALPITATIONS: 0
CHILLS: 0
ORTHOPNEA: 0
FEVER: 0
WEAKNESS: 0
BRUISES/BLEEDS EASILY: 0

## 2022-08-18 NOTE — PROGRESS NOTES
This visit was conducted via Zoom video call using secure and encrypted video conferencing technology to reduce spread of and/or potential exposures to COVID.  The patient was in a private location (home) in the state of [place].    The patient's identity was confirmed and verbal consent was obtained for this virtual visit.     FOLLOW-UP VASCULAR MED VISIT  Jacky Paz is a male who presents 23 for   No chief complaint on file.  Initially referred by PCP (LEONARDO Anderson) for evaluation and mgmt of HLD      Subjective     FH:  Worried about interval CAC scoring.  Had labs too.  Tolerating med, no sx.    Current treatment: High intensity statin   rosuva 40mg, zetia 10mg, plant sterols   Known FH with family hx of FH and CAD.    Baseline cholesterol off tx TC = 399 when age 11. Untreated has been 270s.  Wt reduction 13lb     Antiplatelet/anticoagulation: No     Family History   Problem Relation Age of Onset    Hyperlipidemia Mother         FH, ZF=993 untreated    Heart Disease Mother         4vCABG in 40s, heart valve replacement    Hypertension Mother     Other Mother         carotid artery disease, bilat    Hyperlipidemia Father     Diabetes Father     Hypertension Father     Heart Attack Father 70        70 -1st, 78 - 4vCABG    Abdominal aortic aneurysm Father     Other Father         rupture bile duct,  (78)    Hyperlipidemia Sister         FH, untreated JC=380    Heart Attack Maternal Grandfather         60s,     Hyperlipidemia Maternal Grandfather     Heart Attack Paternal Grandfather          84    Hyperlipidemia Sister         FH,  due to biking accident     Heart Disease Cousin         maternal side, 5vCABG in 30s     Hyperlipidemia Son         GI=484, dx age 11     Current Outpatient Medications:     aspirin, 81 mg, Oral, DAILY    ezetimibe, 10 mg, Oral, QDAY    ezetimibe, 10 mg, Oral, DAILY    rosuvastatin, 40 mg, Oral, DAILY    Vitamin D-3, Take  by mouth.    Plant Sterols and  Stanols (CHOLESTOFF PO), Take  by mouth.       Social History     Tobacco Use    Smoking status: Never    Smokeless tobacco: Never   Substance Use Topics    Alcohol use: No    Drug use: No      DIET AND EXERCISE:  Weight Change: no changes   BMI Readings from Last 5 Encounters:   02/17/22 30.24 kg/m²   08/19/21 32.36 kg/m²   08/21/20 30.43 kg/m²   01/10/20 30.88 kg/m²   06/14/19 30.15 kg/m²      Diet: low fat  Exercise: moderate regular exercise program, 3mi 3x/week      Review of Systems   Constitutional:  Negative for chills and fever.   Respiratory:  Negative for cough.    Cardiovascular:  Negative for chest pain, palpitations, orthopnea, claudication and leg swelling.   Gastrointestinal:  Negative for nausea.   Musculoskeletal:  Negative for myalgias.   Neurological:  Negative for weakness.   Endo/Heme/Allergies:  Does not bruise/bleed easily.        Objective     There were no vitals filed for this visit.     BP Readings from Last 4 Encounters:   02/17/22 126/63   08/19/21 130/73   08/21/20 126/64   01/10/20 125/62     There is no height or weight on file to calculate BMI.     Physical Exam  Constitutional:       General: He is not in acute distress.     Appearance: He is well-developed. He is not diaphoretic.   HENT:      Head: Normocephalic.   Eyes:      Conjunctiva/sclera: Conjunctivae normal.   Pulmonary:      Effort: Pulmonary effort is normal. No respiratory distress.   Skin:     Coloration: Skin is not pale.      Findings: No rash.   Neurological:      Mental Status: He is alert and oriented to person, place, and time.      Cranial Nerves: No cranial nerve deficit.   Psychiatric:         Behavior: Behavior normal.     Lab Results   Component Value Date    CHOLSTRLTOT 124 02/11/2022    LDL 62 02/11/2022    HDL 48 02/11/2022    TRIGLYCERIDE 68 02/11/2022         Lab Results   Component Value Date/Time    LIPOPROTA 84 (H) 08/15/2022 11:30 AM        Lab Results   Component Value Date/Time    APOB 85  12/16/2020 08:20 AM         Lab Results   Component Value Date    HBA1C 5.0 02/11/2022      Lab Results   Component Value Date    SODIUM 138 02/11/2022    POTASSIUM 4.3 02/11/2022    CHLORIDE 104 02/11/2022    CO2 24 02/11/2022    GLUCOSE 86 02/11/2022    BUN 18 02/11/2022    CREATININE 0.96 02/11/2022    IFAFRICA >60 02/11/2022    IFNOTAFR >60 02/11/2022            VASCULAR IMAGING:     CT CAC scoring 5/18/18  Coronary calcification:  LMA - 0.0  LCX 43.9  LAD 7.4  .5  PDA - 0.0   Calcium score:  155.8    Coronary calcification:  LMA - 0.0  LCX - 64.6  LAD - 42.4  RCA - 226.0  PDA - 0.0   Total Calcium Score: 333.1   Percentile: Calcium score is above the 90th percentile for the patient's age and sex.   Other findings:  Heart: Normal size.  Lungs: Clear.  Mediastinum: Normal.  Upper abdomen: Normal.             Medical Decision Making:  Today's Assessment / Status / Plan:     1. Familial hypercholesterolemia        2. Elevated lipoprotein(a)            Patient Type: Secondary Prevention    Etiology of Established CVD if Present:      1) subclinical CAD as evidenced by high CAC  - multiple vessels, >75th%ile for age/gender   No symptoms or prior ASCVD events, no further cardiac stress testing indicated     - continue med mgmt   - repeat CAC prior to next visit for assessment     Lipid Management:   Qualifies for Statin Therapy Based on 2013 ACC/AHA Guidelines: yes, phenotypic FH  The ASCVD Risk score (Shawn DANIKA Jr, et al., 2013) failed to calculate. LDL>190  Currently on Statin: Yes  hsCRP low risk = 0.5  CAC score (2018) = 155   CAC (2022) = 333 (>90th%ile for age/gender) - indicating higher risk for short-term/long-term CVD - overall should have slight increase of CAC over time due to delipidation and calcification of existing plaque induced by statin, but he shows doubling of CAC and more extensive burden in RCA - LMA remains zero score.    - no current symptoms, no indications for functional stress testing    - lp(a)    Goal:  LDL-C >50% reduction and LDL-C <100,  non-HDL-C <130 (optional apoB <9)   At goal:  Yes, 2/2022   Tx plan:   - continue TLC   - update lp(a) now - will call results   - monitor labs Q6-12mo   meds:  - continue rosuva 40mg  - continue zetia 10mg daily   - consider pcsk9i based upon CAC >300, FH, and marlon if lp(a) more than mild elevated - reviewed potential use, cost, websites with patient     Blood Pressure Management:   Goal: ACC/AHA (2017) goal <130/80   Home BP at goal:  yes   Office BP at goal:  yes  Plan:   - continue home BP monitoring, reviewed correct technique:  Yes   - order 24h ABPM:  NO  - continue healthy lifestyle     Glycemic Status: Normal, mild IFG = 104  - continue healthy lifestyle, monitor labs annually     Anti-Platelet/Anti-Coagulant Tx: continue ASA 81mg daily     LIFESTYLE INTERVENTIONS:    SMOKING:   reports that he has never smoked. He has never used smokeless tobacco.   - continued complete avoidance of all tobacco products     PHYSICAL ACTIVITY: continue healthy activity to improve CV fitness.  In general, targeting >150min/week of moderate-level activity.  Push to 300+min/week for weight mgmt and improved CV health     WEIGHT MANAGEMENT AND NUTRITION: Dietary plan was discussed with patient at this visit including [place] diet plan     - continue wt reduction, good wt loss 13lb    Other:    #  bradycardia - upper 40s, stable, no sx    # Gilbert's - stable total bilirubin levels in 2.0's range, ALT, AST wnl   -May have component of NAFLD due to BMI 30  - unlikely statin-related  - continue healthy diet, activity, lower fat and weight   - monitor Q6mo, if worsening consider PCP and/or GI input     # vit D deficiency  cont Vit D3 5,000 units daily     Instructed to follow-up with PCP for remainder of adult medical needs: yes  We will partner with other providers in the management of established vascular disease and cardiometabolic risk factors.    Studies to Be  Obtained:  Possible repeat CAC in 2025   Labs to Be Obtained:  As noted     Follow up in:  1yr , sooner prn     Shahbaz Dominguez M.D.   [facility]   [facility]   [phone]

## 2023-05-01 DIAGNOSIS — E78.41 ELEVATED LIPOPROTEIN(A): ICD-10-CM

## 2023-05-01 DIAGNOSIS — R93.1 AGATSTON CORONARY ARTERY CALCIUM SCORE BETWEEN 100 AND 199: ICD-10-CM

## 2023-05-01 DIAGNOSIS — E78.01 FAMILIAL HYPERCHOLESTEROLEMIA: ICD-10-CM

## 2023-05-02 RX ORDER — EZETIMIBE 10 MG/1
TABLET ORAL
Qty: 90 TABLET | Refills: 1 | Status: SHIPPED | OUTPATIENT
Start: 2023-05-02 | End: 2023-11-07 | Stop reason: SDUPTHER

## 2023-07-06 NOTE — TELEPHONE ENCOUNTER
ESTABLISHED PATIENT PRE-VISIT PLANNING     Note: Patient will not be contacted if there is no indication to call.     1.  Reviewed notes from the last few office visits within the medical group: Yes 05/07/2018    2.  If any orders were placed at last visit or intended to be done for this visit (i.e. 6 mos follow-up), do we have Results/Consult Notes?        •  Labs - Labs were not ordered at last office visit.   Note: If patient appointment is for lab review and patient did not complete labs, check with provider if OK to reschedule patient until labs completed.       •  Imaging - Imaging was not ordered at last office visit.       •  Referrals - No referrals were ordered at last office visit.    3. Is this appointment scheduled as a Hospital Follow-Up? No    4.  Immunizations were updated in Epic using WebIZ?: Yes       •  Web Iz Recommendations: MMR  and VARICELLA (Chicken Pox)     5.  Patient is due for the following Health Maintenance Topics:   There are no preventive care reminders to display for this patient.    6.  MDX printed for Provider? NO    7.  Patient was NOT informed to arrive 15 min prior to their scheduled appointment and bring in their medication bottles.    
Detail Level: Detailed
Quality 110: Preventive Care And Screening: Influenza Immunization: Influenza immunization was not ordered or administered, reason not given
Quality 226: Preventive Care And Screening: Tobacco Use: Screening And Cessation Intervention: Patient screened for tobacco use and is an ex/non-smoker
Quality 111:Pneumonia Vaccination Status For Older Adults: Patient received any pneumococcal conjugate or polysaccharide vaccine on or after their 60th birthday and before the end of the measurement period

## 2023-07-26 DIAGNOSIS — E78.01 FAMILIAL HYPERCHOLESTEROLEMIA: ICD-10-CM

## 2023-07-26 RX ORDER — ROSUVASTATIN CALCIUM 40 MG/1
40 TABLET, COATED ORAL
Qty: 90 TABLET | Refills: 2 | Status: SHIPPED | OUTPATIENT
Start: 2023-07-26 | End: 2023-10-22 | Stop reason: SDUPTHER

## 2023-08-24 ENCOUNTER — OFFICE VISIT (OUTPATIENT)
Dept: VASCULAR LAB | Facility: MEDICAL CENTER | Age: 43
End: 2023-08-24
Attending: FAMILY MEDICINE

## 2023-08-24 VITALS
SYSTOLIC BLOOD PRESSURE: 122 MMHG | HEART RATE: 48 BPM | DIASTOLIC BLOOD PRESSURE: 70 MMHG | BODY MASS INDEX: 31.76 KG/M2 | WEIGHT: 234.2 LBS

## 2023-08-24 DIAGNOSIS — E78.41 ELEVATED LIPOPROTEIN(A): ICD-10-CM

## 2023-08-24 DIAGNOSIS — E78.01 FAMILIAL HYPERCHOLESTEROLEMIA: ICD-10-CM

## 2023-08-24 DIAGNOSIS — Z79.02 LONG TERM (CURRENT) USE OF ANTITHROMBOTICS/ANTIPLATELETS: ICD-10-CM

## 2023-08-24 DIAGNOSIS — I25.10 CORONARY ARTERY CALCIFICATION SEEN ON CT SCAN: ICD-10-CM

## 2023-08-24 DIAGNOSIS — E80.4 GILBERT SYNDROME: ICD-10-CM

## 2023-08-24 DIAGNOSIS — R93.1 AGATSTON CAC SCORE 200-399: ICD-10-CM

## 2023-08-24 DIAGNOSIS — R53.83 OTHER FATIGUE: ICD-10-CM

## 2023-08-24 PROCEDURE — 99214 OFFICE O/P EST MOD 30 MIN: CPT | Performed by: FAMILY MEDICINE

## 2023-08-24 PROCEDURE — 3078F DIAST BP <80 MM HG: CPT | Performed by: FAMILY MEDICINE

## 2023-08-24 PROCEDURE — 99212 OFFICE O/P EST SF 10 MIN: CPT

## 2023-08-24 PROCEDURE — 3074F SYST BP LT 130 MM HG: CPT | Performed by: FAMILY MEDICINE

## 2023-08-24 ASSESSMENT — ENCOUNTER SYMPTOMS
NAUSEA: 0
CLAUDICATION: 0
FEVER: 0
WEAKNESS: 0
PALPITATIONS: 0
BRUISES/BLEEDS EASILY: 0
COUGH: 0
CHILLS: 0
MYALGIAS: 0
ORTHOPNEA: 0

## 2023-08-24 NOTE — PROGRESS NOTES
This visit was conducted via Zoom video call using secure and encrypted video conferencing technology to reduce spread of and/or potential exposures to COVID.  The patient was in a private location (home) in the state of [place].    The patient's identity was confirmed and verbal consent was obtained for this virtual visit.     FOLLOW-UP VASCULAR MED VISIT  23   Jacky Paz is a male who presents for f/u  Initially referred by PCP (LEONARDO Anderson) for evaluation and mgmt of HLD. Established 2018    Subjective     Interval hx/concerns: last seen 1yr ago, no labs, denies changes in health status or meds.    Diet: improved with about 15lb wt reduction since no longer doing extensive Tenriism-related activities  Wt Readings from Last 3 Encounters:   23 106 kg (234 lb 3.2 oz)   22 101 kg (223 lb)   21 105 kg (232 lb)       FH:   Current treatment: High intensity statin   rosuva 40mg, zetia 10mg, plant sterols   Known FH with family hx of FH and CAD.    Baseline cholesterol off tx TC = 399 when age 11. Untreated has been 270s.    Antiplatelet/anticoagulation: No     Family History   Problem Relation Age of Onset    Hyperlipidemia Mother         FH, VQ=718 untreated    Heart Disease Mother         4vCABG in 40s, heart valve replacement    Hypertension Mother     Other Mother         carotid artery disease, bilat    Hyperlipidemia Father     Diabetes Father     Hypertension Father     Heart Attack Father 70        70 -1st, 78 - 4vCABG    Abdominal aortic aneurysm Father     Other Father         rupture bile duct,  (78)    Hyperlipidemia Sister         FH, untreated PC=295    Heart Attack Maternal Grandfather         60s,     Hyperlipidemia Maternal Grandfather     Heart Attack Paternal Grandfather          84    Hyperlipidemia Sister         FH,  due to biking accident     Heart Disease Cousin         maternal side, 5vCABG in 30s     Hyperlipidemia Son         ES=107, dx age 11      Current Outpatient Medications:     rosuvastatin, 40 mg, Oral, QDAY, Taking    ezetimibe, TAKE ONE TABLET BY MOUTH EVERY DAY, Taking    aspirin, 81 mg, Oral, DAILY, Taking    Vitamin D-3, Take  by mouth., Taking    Plant Sterols and Stanols (CHOLESTOFF PO), Take  by mouth., Taking       Social History     Tobacco Use    Smoking status: Never    Smokeless tobacco: Never   Substance Use Topics    Alcohol use: No    Drug use: No      DIET AND EXERCISE:  Weight Change: no changes   Wt Readings from Last 5 Encounters:   08/24/23 106 kg (234 lb 3.2 oz)   02/17/22 101 kg (223 lb)   08/19/21 105 kg (232 lb)   08/21/20 99 kg (218 lb 3.2 oz)   01/10/20 100 kg (221 lb 6.4 oz)      Diet: low fat  Exercise: moderate regular exercise program, 3mi 3x/week      Review of Systems   Constitutional:  Negative for chills and fever.   Respiratory:  Negative for cough.    Cardiovascular:  Negative for chest pain, palpitations, orthopnea, claudication and leg swelling.   Gastrointestinal:  Negative for nausea.   Musculoskeletal:  Negative for myalgias.   Neurological:  Negative for weakness.   Endo/Heme/Allergies:  Does not bruise/bleed easily.          Objective     Vitals:    08/24/23 1502   BP: 122/70   BP Location: Left arm   Patient Position: Sitting   BP Cuff Size: Large adult   Pulse: (!) 48   Weight: 106 kg (234 lb 3.2 oz)     Body mass index is 31.76 kg/m².     Physical Exam  Constitutional:       General: He is not in acute distress.     Appearance: He is well-developed. He is not diaphoretic.   HENT:      Head: Normocephalic.   Eyes:      Conjunctiva/sclera: Conjunctivae normal.   Pulmonary:      Effort: Pulmonary effort is normal. No respiratory distress.   Skin:     Coloration: Skin is not pale.      Findings: No rash.   Neurological:      Mental Status: He is alert and oriented to person, place, and time.      Cranial Nerves: No cranial nerve deficit.   Psychiatric:         Behavior: Behavior normal.       Lab Results    Component Value Date    CHOLSTRLTOT 124 02/11/2022    LDL 62 02/11/2022    HDL 48 02/11/2022    TRIGLYCERIDE 68 02/11/2022         Lab Results   Component Value Date/Time    LIPOPROTA 84 (H) 08/15/2022 11:30 AM        Lab Results   Component Value Date/Time    APOB 85 12/16/2020 08:20 AM         Lab Results   Component Value Date    HBA1C 5.0 02/11/2022      Lab Results   Component Value Date    SODIUM 138 02/11/2022    POTASSIUM 4.3 02/11/2022    CHLORIDE 104 02/11/2022    CO2 24 02/11/2022    GLUCOSE 86 02/11/2022    BUN 18 02/11/2022    CREATININE 0.96 02/11/2022    IFAFR[place] >60 02/11/2022    IFNOTAFR >60 02/11/2022            VASCULAR IMAGING:     CAC scoring 5/18/18  Coronary calcification:  LMA - 0.0  LCX 43.9  LAD 7.4  .5  PDA - 0.0   Calcium score:  155.8    CACS 1/2022  Coronary calcification:  LMA - 0.0  LCX - 64.6  LAD - 42.4  RCA - 226.0  PDA - 0.0   Total Calcium Score: 333.1   Percentile: Calcium score is above the 90th percentile for the patient's age and sex.   Other findings:  Heart: Normal size.  Lungs: Clear.  Mediastinum: Normal.  Upper abdomen: Normal.             Medical Decision Making:  Today's Assessment / Status / Plan:     1. Familial hypercholesterolemia  Comp Metabolic Panel    Lipid Profile    CRP HIGH SENSITIVE (CARDIAC)      2. Elevated lipoprotein(a)  Comp Metabolic Panel    Lipid Profile    CRP HIGH SENSITIVE (CARDIAC)      3. Coronary artery calcification seen on CT scan        4. Agatston CAC score 200-399  Comp Metabolic Panel    Lipid Profile    CRP HIGH SENSITIVE (CARDIAC)      5. Gilbert syndrome        6. Long term (current) use of antithrombotics/antiplatelets        7. Other fatigue  TESTOSTERONE SERUM          Patient Type: Secondary Prevention, CACS >300 (>75%ile for age/gender)     Etiology of Established CVD if Present:      1) subclinical CAD as evidenced by high CAC  - multiple vessels, >75th%ile for age/gender   No symptoms or prior ASCVD events, no further  cardiac stress testing indicated     - continue med mgmt   - repeat CAC prior to next visit for assessment     Lipid Management:   Qualifies for Statin Therapy Based on 2013 ACC/AHA Guidelines: yes, phenotypic FH  The ASCVD Risk score (Wayne PARKS, et al., 2019) failed to calculate. LDL>190  Currently on Statin: Yes  hsCRP low risk = 0.5  CAC score (2018) = 155   CAC (2022) = 333 (>90th%ile for age/gender) - indicating higher risk for short-term/long-term CVD - overall should have slight increase of CAC over time due to delipidation and calcification of existing plaque induced by statin, but he shows doubling of CAC and more extensive burden in RCA - LMA remains zero score.    - no current symptoms, no indications for functional stress testing   - lp(a)    Goal:  LDL-C <70 preferred due to CACS >300   At goal:  pending   Tx plan:   - continue TLC   - update lp(a) now - will call results   - monitor labs Q6-12mo   meds:  - continue rosuva 40mg  - continue zetia 10mg daily   - consider pcsk9i based upon CAC >300, FH, and marlon if lp(a) more than mild elevated - reviewed potential use, cost, websites with patient     Blood Pressure Management:   Goal: ACC/AHA (2017) goal <130/80   Home BP at goal:  yes   Office BP at goal:  yes  Plan:   - continue home BP monitoring, reviewed correct technique:  Yes   - order 24h ABPM:  NO  - continue healthy lifestyle     Glycemic Status: Normal, mild IFG = 104  - continue healthy lifestyle, monitor labs annually     Anti-Platelet/Anti-Coagulant Tx: continue ASA 81mg daily     LIFESTYLE INTERVENTIONS:    SMOKING:   reports that he has never smoked. He has never used smokeless tobacco.   - continued complete avoidance of all tobacco products     PHYSICAL ACTIVITY: continue healthy activity to improve CV fitness.  In general, targeting >150min/week of moderate-level activity.  Push to 300+min/week for weight mgmt and improved CV health     WEIGHT MANAGEMENT AND NUTRITION: Dietary plan was  discussed with patient at this visit including Mediterrean diet plan     - continue wt reduction, has reduced 15lb over last few months   - consider wt loss meds in future - would defer to PCP for eval     Other:    #  bradycardia - upper 40s, stable, no sx    # Gilbert's - stable total bilirubin levels in 2.0's range, ALT, AST wnl   - May have component of NAFLD due to BMI 30  - unlikely statin-related  - continue healthy diet, activity, lower fat and weight   - monitor Q6mo, if worsening consider PCP and/or GI input     # vit D deficiency  cont Vit D3 5,000 units daily     # fatigue - would like testosterone ordered, I advised I would order but if any abnl would need to f/u with PCP for further assessment and review as outside scope of care with vasc med      Instructed to follow-up with PCP for remainder of adult medical needs: yes  We will partner with other providers in the management of established vascular disease and cardiometabolic risk factors.    Studies to Be Obtained:  Possible repeat CAC in 2025   Labs to Be Obtained:  see orders     Follow up in:  will call lab results,  f/u 1yr , sooner shruthi Dominguez M.D.   [facility]   [facility]   [phone]

## 2023-09-29 ENCOUNTER — HOSPITAL ENCOUNTER (OUTPATIENT)
Dept: LAB | Facility: MEDICAL CENTER | Age: 43
End: 2023-09-29
Attending: FAMILY MEDICINE

## 2023-09-29 DIAGNOSIS — E78.01 FAMILIAL HYPERCHOLESTEROLEMIA: ICD-10-CM

## 2023-09-29 DIAGNOSIS — R93.1 AGATSTON CAC SCORE 200-399: ICD-10-CM

## 2023-09-29 DIAGNOSIS — E78.41 ELEVATED LIPOPROTEIN(A): ICD-10-CM

## 2023-09-29 DIAGNOSIS — R53.83 OTHER FATIGUE: ICD-10-CM

## 2023-09-29 LAB
ALBUMIN SERPL BCP-MCNC: 4.8 G/DL (ref 3.2–4.9)
ALBUMIN/GLOB SERPL: 1.8 G/DL
ALP SERPL-CCNC: 84 U/L (ref 30–99)
ALT SERPL-CCNC: 66 U/L (ref 2–50)
ANION GAP SERPL CALC-SCNC: 9 MMOL/L (ref 7–16)
AST SERPL-CCNC: 53 U/L (ref 12–45)
BILIRUB SERPL-MCNC: 2.2 MG/DL (ref 0.1–1.5)
BUN SERPL-MCNC: 18 MG/DL (ref 8–22)
CALCIUM ALBUM COR SERPL-MCNC: 9.4 MG/DL (ref 8.5–10.5)
CALCIUM SERPL-MCNC: 10 MG/DL (ref 8.5–10.5)
CHLORIDE SERPL-SCNC: 106 MMOL/L (ref 96–112)
CHOLEST SERPL-MCNC: 135 MG/DL (ref 100–199)
CO2 SERPL-SCNC: 26 MMOL/L (ref 20–33)
CREAT SERPL-MCNC: 0.93 MG/DL (ref 0.5–1.4)
CRP SERPL HS-MCNC: 0.4 MG/L (ref 0–3)
FASTING STATUS PATIENT QL REPORTED: NORMAL
GFR SERPLBLD CREATININE-BSD FMLA CKD-EPI: 105 ML/MIN/1.73 M 2
GLOBULIN SER CALC-MCNC: 2.7 G/DL (ref 1.9–3.5)
GLUCOSE SERPL-MCNC: 95 MG/DL (ref 65–99)
HDLC SERPL-MCNC: 46 MG/DL
LDLC SERPL CALC-MCNC: 72 MG/DL
POTASSIUM SERPL-SCNC: 4.7 MMOL/L (ref 3.6–5.5)
PROT SERPL-MCNC: 7.5 G/DL (ref 6–8.2)
SODIUM SERPL-SCNC: 141 MMOL/L (ref 135–145)
TESTOST SERPL-MCNC: 287 NG/DL (ref 175–781)
TRIGL SERPL-MCNC: 87 MG/DL (ref 0–149)

## 2023-09-29 PROCEDURE — 84403 ASSAY OF TOTAL TESTOSTERONE: CPT

## 2023-09-29 PROCEDURE — 80061 LIPID PANEL: CPT

## 2023-09-29 PROCEDURE — 80053 COMPREHEN METABOLIC PANEL: CPT

## 2023-09-29 PROCEDURE — 86141 C-REACTIVE PROTEIN HS: CPT

## 2023-09-29 PROCEDURE — 36415 COLL VENOUS BLD VENIPUNCTURE: CPT

## 2023-10-02 DIAGNOSIS — E80.4 GILBERT SYNDROME: ICD-10-CM

## 2023-10-02 DIAGNOSIS — R74.8 ELEVATED LIVER ENZYMES: ICD-10-CM

## 2023-10-02 NOTE — PROGRESS NOTES
Slight high Tbili and liver enzymes.    Does not appear to be statin-induced as has been on statin consistently for many years w/o prior.    Focus on healthy diet, lower fat and limited Etoh.   Likely gilbert's underlying.    Rec repeat CMP in about 6mo to monitor.  If worsening, should consider further w/u with PCP and/or GI.

## 2023-11-07 DIAGNOSIS — E78.01 FAMILIAL HYPERCHOLESTEROLEMIA: ICD-10-CM

## 2023-11-07 DIAGNOSIS — R93.1 AGATSTON CORONARY ARTERY CALCIUM SCORE BETWEEN 100 AND 199: ICD-10-CM

## 2023-11-07 DIAGNOSIS — E78.41 ELEVATED LIPOPROTEIN(A): ICD-10-CM

## 2023-11-07 RX ORDER — EZETIMIBE 10 MG/1
10 TABLET ORAL
Qty: 90 TABLET | Refills: 3 | Status: SHIPPED | OUTPATIENT
Start: 2023-11-07

## 2024-07-29 DIAGNOSIS — E78.01 FAMILIAL HYPERCHOLESTEROLEMIA: ICD-10-CM

## 2024-07-29 RX ORDER — ROSUVASTATIN CALCIUM 40 MG/1
40 TABLET, COATED ORAL
Qty: 100 TABLET | Refills: 3 | Status: SHIPPED | OUTPATIENT
Start: 2024-07-29

## 2024-08-01 DIAGNOSIS — E78.01 FAMILIAL HYPERCHOLESTEROLEMIA: ICD-10-CM

## 2024-08-30 ENCOUNTER — HOSPITAL ENCOUNTER (OUTPATIENT)
Dept: LAB | Facility: MEDICAL CENTER | Age: 44
End: 2024-08-30
Attending: FAMILY MEDICINE

## 2024-08-30 DIAGNOSIS — E78.01 FAMILIAL HYPERCHOLESTEROLEMIA: ICD-10-CM

## 2024-08-30 DIAGNOSIS — E80.4 GILBERT SYNDROME: ICD-10-CM

## 2024-08-30 DIAGNOSIS — R74.8 ELEVATED LIVER ENZYMES: ICD-10-CM

## 2024-08-30 LAB
ALBUMIN SERPL BCP-MCNC: 4.7 G/DL (ref 3.2–4.9)
ALBUMIN/GLOB SERPL: 1.8 G/DL
ALP SERPL-CCNC: 80 U/L (ref 30–99)
ALT SERPL-CCNC: 42 U/L (ref 2–50)
ANION GAP SERPL CALC-SCNC: 9 MMOL/L (ref 7–16)
AST SERPL-CCNC: 37 U/L (ref 12–45)
BILIRUB SERPL-MCNC: 2.5 MG/DL (ref 0.1–1.5)
BUN SERPL-MCNC: 21 MG/DL (ref 8–22)
CALCIUM ALBUM COR SERPL-MCNC: 9.2 MG/DL (ref 8.5–10.5)
CALCIUM SERPL-MCNC: 9.8 MG/DL (ref 8.5–10.5)
CHLORIDE SERPL-SCNC: 107 MMOL/L (ref 96–112)
CHOLEST SERPL-MCNC: 156 MG/DL (ref 100–199)
CO2 SERPL-SCNC: 26 MMOL/L (ref 20–33)
CREAT SERPL-MCNC: 0.92 MG/DL (ref 0.5–1.4)
FASTING STATUS PATIENT QL REPORTED: NORMAL
FERRITIN SERPL-MCNC: 102 NG/ML (ref 22–322)
GFR SERPLBLD CREATININE-BSD FMLA CKD-EPI: 106 ML/MIN/1.73 M 2
GLOBULIN SER CALC-MCNC: 2.6 G/DL (ref 1.9–3.5)
GLUCOSE SERPL-MCNC: 92 MG/DL (ref 65–99)
HAV IGM SERPL QL IA: NORMAL
HBV CORE IGM SER QL: NORMAL
HBV SURFACE AG SER QL: NORMAL
HCV AB SER QL: NORMAL
HDLC SERPL-MCNC: 48 MG/DL
IRON SATN MFR SERPL: 34 % (ref 15–55)
IRON SERPL-MCNC: 104 UG/DL (ref 50–180)
LDLC SERPL CALC-MCNC: 93 MG/DL
POTASSIUM SERPL-SCNC: 4.7 MMOL/L (ref 3.6–5.5)
PROT SERPL-MCNC: 7.3 G/DL (ref 6–8.2)
SODIUM SERPL-SCNC: 142 MMOL/L (ref 135–145)
TIBC SERPL-MCNC: 309 UG/DL (ref 250–450)
TRIGL SERPL-MCNC: 76 MG/DL (ref 0–149)
UIBC SERPL-MCNC: 205 UG/DL (ref 110–370)

## 2024-08-30 PROCEDURE — 82728 ASSAY OF FERRITIN: CPT

## 2024-08-30 PROCEDURE — 80061 LIPID PANEL: CPT

## 2024-08-30 PROCEDURE — 83540 ASSAY OF IRON: CPT

## 2024-08-30 PROCEDURE — 80074 ACUTE HEPATITIS PANEL: CPT

## 2024-08-30 PROCEDURE — 80053 COMPREHEN METABOLIC PANEL: CPT

## 2024-08-30 PROCEDURE — 83550 IRON BINDING TEST: CPT

## 2024-08-30 PROCEDURE — 36415 COLL VENOUS BLD VENIPUNCTURE: CPT

## 2024-09-03 ENCOUNTER — TELEPHONE (OUTPATIENT)
Dept: VASCULAR LAB | Facility: MEDICAL CENTER | Age: 44
End: 2024-09-03

## 2024-09-03 DIAGNOSIS — E78.00 PURE HYPERCHOLESTEROLEMIA: ICD-10-CM

## 2024-09-05 ENCOUNTER — OFFICE VISIT (OUTPATIENT)
Dept: VASCULAR LAB | Facility: MEDICAL CENTER | Age: 44
End: 2024-09-05
Attending: FAMILY MEDICINE

## 2024-09-05 VITALS
SYSTOLIC BLOOD PRESSURE: 110 MMHG | HEART RATE: 44 BPM | HEIGHT: 71 IN | BODY MASS INDEX: 29.82 KG/M2 | WEIGHT: 213 LBS | DIASTOLIC BLOOD PRESSURE: 71 MMHG

## 2024-09-05 DIAGNOSIS — Z91.89 OTHER SPECIFIED PERSONAL RISK FACTORS, NOT ELSEWHERE CLASSIFIED: ICD-10-CM

## 2024-09-05 DIAGNOSIS — E66.3 OVERWEIGHT (BMI 25.0-29.9): ICD-10-CM

## 2024-09-05 DIAGNOSIS — E78.01 FAMILIAL HYPERCHOLESTEROLEMIA: Chronic | ICD-10-CM

## 2024-09-05 DIAGNOSIS — I25.10 CORONARY ARTERY CALCIFICATION SEEN ON CT SCAN: Chronic | ICD-10-CM

## 2024-09-05 DIAGNOSIS — E80.4 GILBERT SYNDROME: Chronic | ICD-10-CM

## 2024-09-05 DIAGNOSIS — E78.41 ELEVATED LIPOPROTEIN(A): Chronic | ICD-10-CM

## 2024-09-05 DIAGNOSIS — R93.1 AGATSTON CORONARY ARTERY CALCIUM SCORE BETWEEN 100 AND 199: ICD-10-CM

## 2024-09-05 DIAGNOSIS — R93.1 AGATSTON CAC SCORE 200-399: Chronic | ICD-10-CM

## 2024-09-05 PROCEDURE — 3074F SYST BP LT 130 MM HG: CPT | Performed by: FAMILY MEDICINE

## 2024-09-05 PROCEDURE — 99212 OFFICE O/P EST SF 10 MIN: CPT

## 2024-09-05 PROCEDURE — 3078F DIAST BP <80 MM HG: CPT | Performed by: FAMILY MEDICINE

## 2024-09-05 PROCEDURE — 99213 OFFICE O/P EST LOW 20 MIN: CPT | Performed by: FAMILY MEDICINE

## 2024-09-05 RX ORDER — EZETIMIBE 10 MG/1
10 TABLET ORAL
Qty: 100 TABLET | Refills: 3 | Status: SHIPPED | OUTPATIENT
Start: 2024-09-05

## 2024-09-05 ASSESSMENT — ENCOUNTER SYMPTOMS
CLAUDICATION: 0
CHILLS: 0
NAUSEA: 0
COUGH: 0
WEAKNESS: 0
MYALGIAS: 0
PALPITATIONS: 0
FEVER: 0
ORTHOPNEA: 0
BRUISES/BLEEDS EASILY: 0

## 2024-09-05 NOTE — PROGRESS NOTES
FOLLOW-UP VASCULAR MED VISIT  24   Jacky Paz is a male who presents for f/u  Initially referred by PCP (LEONARDO Anderson) for evaluation and mgmt of HLD, est 2018    Subjective     Interval hx/concerns: last seen 2023, feeling well. Continued wt reduction over time. No new symptoms.  Tolerating all meds      FH:   Current treatment: High intensity statin   rosuva 40mg, zetia 10mg, plant sterols   Known FH with family hx of FH and CAD.    Baseline cholesterol off tx TC = 399 when age 11. Untreated has been 270s.    Antiplatelet/anticoagulation: No     Family History   Problem Relation Age of Onset    Hyperlipidemia Mother         FH, SO=007 untreated    Heart Disease Mother         4vCABG in 40s, heart valve replacement    Hypertension Mother     Other Mother         carotid artery disease, bilat    Hyperlipidemia Father     Diabetes Father     Hypertension Father     Heart Attack Father 70        70 -1st, 78 - 4vCABG    Abdominal aortic aneurysm Father     Other Father         rupture bile duct,  (78)    Hyperlipidemia Sister         FH, untreated FD=461    Hyperlipidemia Sister         FH,  due to biking accident     No Known Problems Brother         high CACS    No Known Problems Brother     Other Brother         anaphylaxis ([place])    Heart Attack Maternal Grandfather         60s,     Hyperlipidemia Maternal Grandfather     Heart Attack Paternal Grandfather          84    Hyperlipidemia Son         JY=[id number], dx age 11    Heart Disease Cousin         maternal side, 5vCABG in 30s      Current Outpatient Medications on File Prior to Visit   Medication Sig Dispense Refill    rosuvastatin (CRESTOR) 40 MG tablet Take 1 Tablet by mouth every day. 100 Tablet 3    aspirin 81 MG EC tablet Take 1 Tablet by mouth every day. 30 Tablet     Cholecalciferol (VITAMIN D-3) 125 MCG (5000 UT) Tab Take  by mouth. 30 Tab     Plant Sterols and Stanols (CHOLESTOFF PO) Take  by mouth.       No current  "facility-administered medications on file prior to visit.    No Known Allergies     Social History     Tobacco Use    Smoking status: Never    Smokeless tobacco: Never   Substance Use Topics    Alcohol use: No    Drug use: No      DIET AND EXERCISE:  Weight Change: reduced - 40lb over 3yrs  Diet:  intermittent fast, reduced kcal /day.    Exercise: moderate regular exercise program, walking at lunch, 2-3d/wk  3-4mi    Review of Systems   Constitutional:  Negative for chills and fever.   Respiratory:  Negative for cough.    Cardiovascular:  Negative for chest pain, palpitations, orthopnea, claudication and leg swelling.   Gastrointestinal:  Negative for nausea.   Musculoskeletal:  Negative for myalgias.   Neurological:  Negative for weakness.   Endo/Heme/Allergies:  Does not bruise/bleed easily.      Objective   Vitals:    09/05/24 1506   BP: 110/71   BP Location: Left arm   Patient Position: Sitting   BP Cuff Size: Large adult   Pulse: (!) 44   Weight: 96.6 kg (213 lb)   Height: 1.803 m (5' 11\")     Body mass index is 29.71 kg/m².   Wt Readings from Last 3 Encounters:   09/05/24 96.6 kg (213 lb)   08/24/23 106 kg (234 lb 3.2 oz)   02/17/22 101 kg (223 lb)      Physical Exam  Constitutional:       General: He is not in acute distress.     Appearance: He is well-developed. He is not diaphoretic.   HENT:      Head: Normocephalic.   Eyes:      Conjunctiva/sclera: Conjunctivae normal.   Pulmonary:      Effort: Pulmonary effort is normal. No respiratory distress.   Skin:     Coloration: Skin is not pale.      Findings: No rash.   Neurological:      Mental Status: He is alert and oriented to person, place, and time.      Cranial Nerves: No cranial nerve deficit.   Psychiatric:         Behavior: Behavior normal.       Lab Results   Component Value Date    CHOLSTRLTOT 156 08/30/2024    LDL 93 08/30/2024    HDL 48 08/30/2024    TRIGLYCERIDE 76 08/30/2024         Lab Results   Component Value Date/Time    LIPOPROTA 84 (H) " 08/15/2022 11:30 AM        Lab Results   Component Value Date/Time    APOB 85 12/16/2020 08:20 AM           Lab Results   Component Value Date    SODIUM 142 08/30/2024    POTASSIUM 4.7 08/30/2024    CHLORIDE 107 08/30/2024    CO2 26 08/30/2024    GLUCOSE 92 08/30/2024    BUN 21 08/30/2024    CREATININE 0.92 08/30/2024            VASCULAR IMAGING:     CAC scoring 5/18/18  Coronary calcification:  LMA - 0.0  LCX 43.9  LAD 7.4  .5  PDA - 0.0   Calcium score:  155.8    CACS 1/2022  Coronary calcification:  LMA - 0.0  LCX - 64.6  LAD - 42.4  RCA - 226.0  PDA - 0.0   Total Calcium Score: 333.1   Percentile: Calcium score is above the 90th percentile for the patient's age and sex.   Other findings:  Heart: Normal size.  Lungs: Clear.  Mediastinum: Normal.  Upper abdomen: Normal.         Medical Decision Making:  Today's Assessment / Status / Plan:     1. Familial hypercholesterolemia  ezetimibe (ZETIA) 10 MG Tab    Lipid Profile      2. Elevated lipoprotein(a)  ezetimibe (ZETIA) 10 MG Tab      3. Gilbert syndrome        4. Coronary artery calcification seen on CT scan  Lipid Profile      5. Agatston CAC score 200-399  Lipid Profile      6. Overweight (BMI 25.0-29.9)        7. Agatston coronary artery calcium score between 100 and 199  ezetimibe (ZETIA) 10 MG Tab      8. Other specified personal risk factors, not elsewhere classified  CT-CARDIAC SCORING        Patient Type: Secondary Prevention, CACS >300 (>75%ile for age/gender)     Established CVD    1) Asymptomatic, subclinical CAD (evidenced by CACS = 333 (>90%ile for age/gender) in 2022)   - overall should have slight increase of CAC over time due to delipidation and calcification of existing plaque induced by statin, but he shows doubling of CAC and more extensive burden in RCA - LMA remains zero score.    - no prior dx ASCVD events  - no LMA plaque noted   - no indications for functional testing w/o symptoms   - as reviewed with pt, ACC/AHA guidelines for  chronic CAD mgmt (2023) support GDMT alone as initial mgmt citing multiple RCTs (ISCHEMIA, COURAGE, DORA 2D) demonstrating early revasc strategy plus GDMT did not improve MACE outcomes compared to GDMT alone  Plan:  - repeat CACS in 6mo   - intensified lifestyle mgmt, continue med mgmt as noted  - consider functional testing if new symptoms over time      Lipid Management (FAMILIAR HYPERCHOLESTEROLEMIA)  Qualifies for Statin Therapy Based on 2013 ACC/AHA Guidelines: yes, phenotypic FH  The 10-year ASCVD risk score (Wayne PARKS, et al., 2019) is: 0.8% LDL>190  Currently on Statin: Yes  hsCRP low risk = 0.5  CACS >300   Lp(a) = 84 mg/dl   Goal:  LDL-C <70   At goal:  no, 8/2024   PLAN:  - continue lifestyle mgmt   - monitor labs q6-12mo   meds:  - continue rosuva 40mg  - continue zetia 10mg daily   - if remains >70, will consider nexlizet if <20% to goal or [id number] if >20% (may be better option is cost-effective for patient due to 20% lp(a) lowering potential)  - FOURIER trial yielded evidence that lp(a) reduction with Repatha led to further risk reduction for ASCVD independent of LDL-C lowering, thus very high risk patients with high lp(a) would benefit disproportionately from lipid-lowering strategy that includes [id number] (O'Go, et al, Circulation 2019).      Blood Pressure Management:  Goal: ACC/AHA (2017) goal <130/80  Home BP at goal:  yes  Office BP at goal:  yes  Plan:   - continue home BP monitoring, reviewed correct technique:  Yes   - order 24h ABPM:  NO  - continue healthy lifestyle     Glycemic Status: Normal  - continue healthy lifestyle, monitor labs annually     Anti-Platelet/Anti-Coagulant Tx: continue ASA 81mg daily   CACS >100 favors aspirin therapy as per evidence from [facility] (2020) indicating net benefit in primary prevention patients who are at low risk for bleeding.      LIFESTYLE INTERVENTIONS  TOBACCO:    reports that he has never smoked. He has never used smokeless tobacco.   -  continued complete avoidance of all tobacco products   PHYSICAL ACTIVITY: >150min/week of mod-intensity activity or as much as tolerated  NUTRITION: Mediterranean, Plant-based - increase nuts, beans, whole grains, plant protein for animal 1-2 times/week, and Weight reduction - reduce caloric intake by 300 - 500 kcal/day to achieve 1-2lb weight loss per week    ETOH: limit to 2 or less standard drinks/day   WT MGMT: maintain healthy weight     OTHER:    #  bradycardia - upper 40s, stable, no sx    # Gilbert's - stable total bilirubin levels in 2.0's range, ALT, AST wnl   - May have component of NAFLD due to BMI 30  - unlikely statin-related  - continue healthy diet, activity, lower fat and weight   - monitor Q6mo, if worsening consider PCP and/or GI input     # vit D deficiency  cont Vit D3 5,000 units daily      STUDIES: CACS 3/2025 - ordered    LABS: as noted above  Follow up in: RTC in 6 months for VV    Shahbaz Dominguez M.D.   [facility]   [facility]   [phone]

## 2024-11-06 DIAGNOSIS — E78.01 FAMILIAL HYPERCHOLESTEROLEMIA: ICD-10-CM

## 2024-11-06 RX ORDER — ROSUVASTATIN CALCIUM 40 MG/1
40 TABLET, COATED ORAL
Qty: 100 TABLET | Refills: 3 | Status: SHIPPED | OUTPATIENT
Start: 2024-11-06

## 2024-11-06 NOTE — TELEPHONE ENCOUNTER
Received request via: Patient    Was the patient seen in the last year in this department? Yes    Does the patient have an active prescription (recently filled or refills available) for medication(s) requested? No    Pharmacy Name: [facility]    Does the patient have retirement Plus and need 100-day supply? (This applies to ALL medications) Patient does not have SCP

## 2025-02-13 ENCOUNTER — HOSPITAL ENCOUNTER (OUTPATIENT)
Dept: RADIOLOGY | Facility: MEDICAL CENTER | Age: 45
End: 2025-02-13
Attending: FAMILY MEDICINE
Payer: COMMERCIAL

## 2025-02-13 DIAGNOSIS — Z91.89 OTHER SPECIFIED PERSONAL RISK FACTORS, NOT ELSEWHERE CLASSIFIED: ICD-10-CM

## 2025-02-13 PROCEDURE — 4410556 CT-CARDIAC SCORING (SELF PAY ONLY)

## 2025-02-14 ENCOUNTER — RESULTS FOLLOW-UP (OUTPATIENT)
Dept: VASCULAR LAB | Facility: MEDICAL CENTER | Age: 45
End: 2025-02-14

## 2025-03-06 ENCOUNTER — TELEMEDICINE (OUTPATIENT)
Dept: VASCULAR LAB | Facility: MEDICAL CENTER | Age: 45
End: 2025-03-06
Attending: FAMILY MEDICINE

## 2025-03-06 DIAGNOSIS — R93.1 AGATSTON CAC SCORE, >400: ICD-10-CM

## 2025-03-06 DIAGNOSIS — I25.10 CORONARY ARTERY CALCIFICATION SEEN ON CT SCAN: ICD-10-CM

## 2025-03-06 DIAGNOSIS — E78.01 FAMILIAL HYPERCHOLESTEROLEMIA: ICD-10-CM

## 2025-03-06 PROCEDURE — 99214 OFFICE O/P EST MOD 30 MIN: CPT | Mod: 95 | Performed by: FAMILY MEDICINE

## 2025-03-06 ASSESSMENT — ENCOUNTER SYMPTOMS
MYALGIAS: 0
ORTHOPNEA: 0
FEVER: 0
PALPITATIONS: 0
CHILLS: 0
BRUISES/BLEEDS EASILY: 0
WEAKNESS: 0
COUGH: 0
NAUSEA: 0
CLAUDICATION: 0

## 2025-03-07 NOTE — PROGRESS NOTES
This visit was conducted via Teams video call using secure and encrypted video conferencing technology.  The patient was in a private location (home) in the state of [place].    I am presently located in the clinic.  The patient's identity was confirmed and verbal consent was obtained for this virtual visit.   FOLLOW-UP VASCULAR MED VISIT  25   Jacky Paz ref for for evaluation and mgmt of HLD, est 2018  Initially referred by PCP (LEONARDO Anderson)     Subjective     Interval hx/concerns: last seen 2024, feeling well. Had interval CACS = 520 and labs.   No new symptoms.  Tolerating all meds   Had labs done at [facility] - not available at time of visit   Reports brother (age 57) had CACS = 580, no FH or severe high LDL, had a coronary angio and pending stenting.  This has concerned pt and he is worried about obstructive disease.   No current CP, SOB.  Stable activity levels     FH:  Current treatment: High intensity statin   rosuva 40mg, zetia 10mg, plant sterols   Known FH with family hx of FH and CAD.    Baseline cholesterol off tx TC = 399 when age 11. Untreated has been 270s.    HTN: no prior dx or meds, home BP: 110s/70s     Antithrombotic tx: ASA 81mg daily     Family History   Problem Relation Age of Onset    Hyperlipidemia Mother         FH, HZ=605 untreated    Heart Disease Mother         4vCABG in 40s, heart valve replacement    Hypertension Mother     Other Mother         carotid artery disease, bilat    Hyperlipidemia Father     Diabetes Father     Hypertension Father     Heart Attack Father 70        70 -1st, 78 - 4vCABG    Abdominal aortic aneurysm Father     Other Father         rupture bile duct,  (78)    Hyperlipidemia Sister         FH, untreated DE=339    Hyperlipidemia Sister         FH,  due to biking accident     Heart Disease Brother 57        CAD, PCI    No Known Problems Brother     Other Brother         anaphylaxis ([place])    Heart Attack Maternal Grandfather          60s,     Hyperlipidemia Maternal Grandfather     Heart Attack Paternal Grandfather          84    Hyperlipidemia Son         YM=[id number], dx age 11    Heart Disease Cousin         maternal side, 5vCABG in 30s      Current Outpatient Medications on File Prior to Visit   Medication Sig Dispense Refill    rosuvastatin (CRESTOR) 40 MG tablet Take 1 Tablet by mouth every day. 100 Tablet 3    ezetimibe (ZETIA) 10 MG Tab Take 1 Tablet by mouth every day. 100 Tablet 3    aspirin 81 MG EC tablet Take 1 Tablet by mouth every day. 30 Tablet     Cholecalciferol (VITAMIN D-3) 125 MCG (5000 UT) Tab Take  by mouth. 30 Tab     Plant Sterols and Stanols (CHOLESTOFF PO) Take  by mouth.       No current facility-administered medications on file prior to visit.    No Known Allergies     Social History     Tobacco Use    Smoking status: Never    Smokeless tobacco: Never   Substance Use Topics    Alcohol use: No    Drug use: No      DIET AND EXERCISE:  Weight Change: reduced - 40lb over 3yrs  Diet:  intermittent fast, reduced kcal /day.    Exercise: moderate regular exercise program, walking at lunch, 2-3d/wk  3-4mi    Review of Systems   Constitutional:  Negative for chills and fever.   Respiratory:  Negative for cough.    Cardiovascular:  Negative for chest pain, palpitations, orthopnea, claudication and leg swelling.   Gastrointestinal:  Negative for nausea.   Musculoskeletal:  Negative for myalgias.   Neurological:  Negative for weakness.   Endo/Heme/Allergies:  Does not bruise/bleed easily.      Objective   There were no vitals filed for this visit.    Wt Readings from Last 3 Encounters:   24 96.6 kg (213 lb)   23 106 kg (234 lb 3.2 oz)   22 101 kg (223 lb)      BMI Readings from Last 1 Encounters:   24 29.71 kg/m²      BP Readings from Last 3 Encounters:   24 110/71   23 122/70   22 126/63      Physical Exam  Constitutional:       General: He is not in acute distress.     Appearance:  He is well-developed. He is not diaphoretic.   HENT:      Head: Normocephalic.   Eyes:      Conjunctiva/sclera: Conjunctivae normal.   Pulmonary:      Effort: Pulmonary effort is normal. No respiratory distress.   Skin:     Coloration: Skin is not pale.      Findings: No rash.   Neurological:      Mental Status: He is alert and oriented to person, place, and time.      Cranial Nerves: No cranial nerve deficit.   Psychiatric:         Behavior: Behavior normal.       Lab Results   Component Value Date    CHOLSTRLTOT 156 08/30/2024    LDL 93 08/30/2024    HDL 48 08/30/2024    TRIGLYCERIDE 76 08/30/2024         Lab Results   Component Value Date/Time    LIPOPROTA 84 (H) 08/15/2022 11:30 AM        Lab Results   Component Value Date/Time    APOB 85 12/16/2020 08:20 AM           Lab Results   Component Value Date    SODIUM 142 08/30/2024    POTASSIUM 4.7 08/30/2024    CHLORIDE 107 08/30/2024    CO2 26 08/30/2024    GLUCOSE 92 08/30/2024    BUN 21 08/30/2024    CREATININE 0.92 08/30/2024            VASCULAR IMAGING:     CAC scoring 5/18/18  Coronary calcification:  LMA - 0.0  LCX 43.9  LAD 7.4  .5  PDA - 0.0   Calcium score:  155.8    CACS 1/2022  Coronary calcification:  LMA - 0.0  LCX - 64.6  LAD - 42.4  RCA - 226.0  PDA - 0.0   Total Calcium Score: 333.1   Percentile: Calcium score is above the 90th percentile for the patient's age and sex.   Other findings:  Heart: Normal size.  Lungs: Clear.  Mediastinum: Normal.  Upper abdomen: Normal    2/2025 CACS  Coronary calcification:  LMA - 0.0  LCX - 105.9  LAD - 88.7  RCA - 325.9   Total Calcium Score: 520.5   Percentile: Calcium score is above the 90th percentile for the patient's age and sex.   Other findings:  Heart: Normal size  Lungs: Clear  Mediastinum: Normal  Upper abdomen: Normal             Medical Decision Making:  Today's Assessment / Status / Plan:     1. Familial hypercholesterolemia  NM-CARDIAC TREADMILL ONLY-NO IMAGING    CANCELED: NM-CARDIAC STRESS  TEST      2. Coronary artery calcification seen on CT scan  NM-CARDIAC TREADMILL ONLY-NO IMAGING    CANCELED: NM-CARDIAC STRESS TEST      3. Agatston CAC score, >400  NM-CARDIAC TREADMILL ONLY-NO IMAGING    CANCELED: NM-CARDIAC STRESS TEST        Patient Type: Secondary Prevention, CACS >300 (>75%ile for age/gender)     Established CVD    1) Asymptomatic, subclinical CAD (evidenced by CACS = 520(>90%ile for age/gender) in 2022)  2025 CACS = 520, roughly 5x increase over last 7yrs.  As reviewed with pt, CACS always increases with statin use due to plaque stabilization from soft, lipid-rich plaque to more stable calcified plaque   - more current evidence per ACC would indicate that while statins slow the progression of overall coronary atherosclerosis volume and reduce high-risk plaque features, they increase plaque density, which contributes to a higher CAC score.  This densification of plaques is associated with a more stable plaque phenotype, which is less likely to rupture and cause acute cardiovascular events (Journal of the American College of Cardiology. 2022;80(14):3889-8137)  - no prior dx ASCVD events  - no LMA plaque noted   - brother (age 57) with similar score, had direct coronary angio and requires multiple stents, so clearly there is concern for early, severe obstructive CAD and is prudent for further risk stratification with functional vs advanced imaging   - after review of options including ETT w/o imaging vs with imaging, or CCTA, or consult with cardiology, we have opted for ETT w/o imaging as most efficient and cost-effective at this time   - as reviewed with pt, ACC/AHA guidelines for chronic CAD mgmt (2023) support GDMT alone as initial mgmt citing multiple RCTs (ISCHEMIA, COURAGE, DORA 2D) demonstrating early revasc strategy plus GDMT did not improve MACE outcomes compared to GDMT alone  Plan:  - check exercise treadmill stress test w/o imaging   - intensified lifestyle mgmt, continue med mgmt  as noted  - consider functional testing if new symptoms over time      Lipid Management (FAMILIAL HYPERCHOLESTEROLEMIA)  Qualifies for Statin Therapy per ACC/AHA Guidelines: yes, phenotypic FH  PCE risk score:  n/a due to baseline LDL >190  LUCIO risk score (extrapolated to age 46, 2025 CACS data, using 8/2024 lipids):  Though likely underestimate due to high baseline LDL-C >190, this shows signficance of his CACS %ile and tripling of risk score           Currently on Statin: Yes  hsCRP 0.4 (low risk, 2023)  Lp(a) = 84 mg/dl   Goal:  LDL-C <70, in light of progressive CACS and lp(a), could consider <55 reasonable   At goal:  pending labs   PLAN:  - continue lifestyle mgmt   - monitor labs q6-12mo   meds:  - continue rosuva 40mg  - continue ezetimibe 10mg daily   - reviewed use of additional meds:  if remains >70, will consider nexlizet if <20% to goal or [id number] if >20% (may be better option is cost-effective for patient due to 20% lp(a) lowering potential)  - FOURIER trial yielded evidence that lp(a) reduction with Repatha led to further risk reduction for ASCVD independent of LDL-C lowering, thus very high risk patients with high lp(a) would benefit disproportionately from lipid-lowering strategy that includes LVAO0nIt (O'Go, et al, Circulation 2019).      Blood Pressure Management:  Office BP goal per ACC/AHA <130/80  Home BP at goal:  yes  Office BP at goal:  yes  Plan:   - continue home BP monitoring, reviewed correct technique:  Yes   - order 24h ABPM:  NO  - continue healthy lifestyle     Glycemic Status: Normal  - continue healthy lifestyle, monitor labs annually     Antithrombotic Tx: continue ASA 81mg daily   CACS >100 favors aspirin therapy as per evidence from [facility] (2020) indicating net benefit in primary prevention patients who are at low risk for bleeding.      LIFESTYLE INTERVENTIONS  TOBACCO:    reports that he has never smoked. He has never used smokeless tobacco.   - continued  complete avoidance of all tobacco products   PHYSICAL ACTIVITY: >150min/week of mod-intensity activity or as much as tolerated  NUTRITION: Mediterranean, Plant-based - increase nuts, beans, whole grains, plant protein for animal 1-2 times/week, and Weight reduction - reduce caloric intake by 300 - 500 kcal/day to achieve 1-2lb weight loss per week    ETOH: limit to 2 or less standard drinks/day   WT MGMT: maintain healthy weight     OTHER:    #  bradycardia - upper 40s, stable, no sx    # Gilbert's - stable total bilirubin levels in 2.0's range, ALT, AST wnl   - May have component of NAFLD due to BMI 30  - unlikely statin-related  - continue healthy diet, activity, lower fat and weight   - monitor Q6mo, if worsening consider PCP and/or GI input     # vit D deficiency  cont Vit D3 5,000 units daily      STUDIES: treadmill stress test - will contact with results   LABS: as noted above - pending results report - will contact with results   FOLLOW-UP: 6 months or sooner based upon test results     Shahbaz Dominguez M.D.   [facility]   [facility]   [phone]

## 2025-05-01 ENCOUNTER — TELEPHONE (OUTPATIENT)
Dept: VASCULAR LAB | Facility: MEDICAL CENTER | Age: 45
End: 2025-05-01

## 2025-05-01 NOTE — TELEPHONE ENCOUNTER
----- Message -----        From:Physician Shahbaz Dominguez M.D.        Sent:4/17/2025  5:29 PM PDT          To:Jacky Paz     Subject:Lipid panel     Hi, we never received results from [facility]. Thanks for sharing them.  I've requested copies of the formal report.    The LDL is 97 and goal is <70.   You need about 30% further reduction to achieve goal.   In light of the high lipoprotein (a) in the past, I would suggest our next step to be injectable Repatha or Praluent.  Unfortunately both options are costly around $600/mo.  That being said, we can attempt to get through [facility] and try to determine if you qualify for discount pricing or patient assistance programs.    These meds lower LDL by another 50-60%,  lower lp(a) 20-30%.      Other options that are a little less potent (18-25% LDL reduction) and have no effect on lp(a) is nexletol.  This can be combined with zetia as Nexlizet.  Good rx cost = $231 for 30 tabs.  We could try this as well via the [facility] to seek discounts.     Let me know what you'd like to pursue.

## 2025-05-01 NOTE — TELEPHONE ENCOUNTER
Called and left message to discuss lab results (see below) from dr Dominguez.    Please forward call to vascular MA to discuss.    Crystal Perera, Med Ass't  [facility]  Ph. [phone]  Fx. [phone]